# Patient Record
Sex: MALE | Employment: OTHER | URBAN - METROPOLITAN AREA
[De-identification: names, ages, dates, MRNs, and addresses within clinical notes are randomized per-mention and may not be internally consistent; named-entity substitution may affect disease eponyms.]

---

## 2022-10-29 ENCOUNTER — HOSPITAL ENCOUNTER (INPATIENT)
Facility: HOSPITAL | Age: 87
LOS: 3 days | Discharge: HOME | DRG: 378 | End: 2022-11-01
Attending: INTERNAL MEDICINE | Admitting: INTERNAL MEDICINE
Payer: MEDICARE

## 2022-10-29 DIAGNOSIS — K25.4 GASTROINTESTINAL HEMORRHAGE ASSOCIATED WITH GASTRIC ULCER: Primary | ICD-10-CM

## 2022-10-29 DIAGNOSIS — K92.2 GI BLEED: ICD-10-CM

## 2022-10-29 PROBLEM — I48.20 ATRIAL FIBRILLATION, CHRONIC: Status: ACTIVE | Noted: 2022-10-29

## 2022-10-29 PROBLEM — I10 PRIMARY HYPERTENSION: Status: ACTIVE | Noted: 2022-10-29

## 2022-10-29 PROBLEM — D62 ACUTE BLOOD LOSS ANEMIA: Status: ACTIVE | Noted: 2022-10-29

## 2022-10-29 LAB
ABO + RH BLD: NORMAL
ANION GAP SERPL CALC-SCNC: 9 MMOL/L (ref 8–16)
BASOPHILS # BLD AUTO: 0.06 K/UL (ref 0–0.2)
BASOPHILS NFR BLD: 0.5 % (ref 0–1.9)
BLD GP AB SCN CELLS X3 SERPL QL: NORMAL
BUN SERPL-MCNC: 23 MG/DL (ref 8–23)
CALCIUM SERPL-MCNC: 8 MG/DL (ref 8.7–10.5)
CHLORIDE SERPL-SCNC: 120 MMOL/L (ref 95–110)
CO2 SERPL-SCNC: 19 MMOL/L (ref 23–29)
CREAT SERPL-MCNC: 0.9 MG/DL (ref 0.5–1.4)
DIFFERENTIAL METHOD: ABNORMAL
EOSINOPHIL # BLD AUTO: 0.2 K/UL (ref 0–0.5)
EOSINOPHIL NFR BLD: 1.2 % (ref 0–8)
ERYTHROCYTE [DISTWIDTH] IN BLOOD BY AUTOMATED COUNT: 17.6 % (ref 11.5–14.5)
EST. GFR  (NO RACE VARIABLE): >60 ML/MIN/1.73 M^2
GLUCOSE SERPL-MCNC: 141 MG/DL (ref 70–110)
HCT VFR BLD AUTO: 27.6 % (ref 40–54)
HGB BLD-MCNC: 9.2 G/DL (ref 14–18)
IMM GRANULOCYTES # BLD AUTO: 0.12 K/UL (ref 0–0.04)
IMM GRANULOCYTES NFR BLD AUTO: 1 % (ref 0–0.5)
INR PPP: 1.1 (ref 0.8–1.2)
LYMPHOCYTES # BLD AUTO: 0.7 K/UL (ref 1–4.8)
LYMPHOCYTES NFR BLD: 5.3 % (ref 18–48)
MCH RBC QN AUTO: 30.9 PG (ref 27–31)
MCHC RBC AUTO-ENTMCNC: 33.3 G/DL (ref 32–36)
MCV RBC AUTO: 93 FL (ref 82–98)
MONOCYTES # BLD AUTO: 0.8 K/UL (ref 0.3–1)
MONOCYTES NFR BLD: 6.6 % (ref 4–15)
NEUTROPHILS # BLD AUTO: 10.5 K/UL (ref 1.8–7.7)
NEUTROPHILS NFR BLD: 85.4 % (ref 38–73)
NRBC BLD-RTO: 0 /100 WBC
PLATELET # BLD AUTO: 147 K/UL (ref 150–450)
PMV BLD AUTO: 9.4 FL (ref 9.2–12.9)
POTASSIUM SERPL-SCNC: 3.7 MMOL/L (ref 3.5–5.1)
PROTHROMBIN TIME: 11.5 SEC (ref 9–12.5)
RBC # BLD AUTO: 2.98 M/UL (ref 4.6–6.2)
SODIUM SERPL-SCNC: 148 MMOL/L (ref 136–145)
WBC # BLD AUTO: 12.34 K/UL (ref 3.9–12.7)

## 2022-10-29 PROCEDURE — 85025 COMPLETE CBC W/AUTO DIFF WBC: CPT | Performed by: HOSPITALIST

## 2022-10-29 PROCEDURE — C9113 INJ PANTOPRAZOLE SODIUM, VIA: HCPCS | Performed by: STUDENT IN AN ORGANIZED HEALTH CARE EDUCATION/TRAINING PROGRAM

## 2022-10-29 PROCEDURE — 36415 COLL VENOUS BLD VENIPUNCTURE: CPT | Performed by: HOSPITALIST

## 2022-10-29 PROCEDURE — 63600175 PHARM REV CODE 636 W HCPCS: Performed by: STUDENT IN AN ORGANIZED HEALTH CARE EDUCATION/TRAINING PROGRAM

## 2022-10-29 PROCEDURE — 25000003 PHARM REV CODE 250: Performed by: INTERNAL MEDICINE

## 2022-10-29 PROCEDURE — 80048 BASIC METABOLIC PNL TOTAL CA: CPT | Performed by: HOSPITALIST

## 2022-10-29 PROCEDURE — 11000001 HC ACUTE MED/SURG PRIVATE ROOM

## 2022-10-29 PROCEDURE — 85610 PROTHROMBIN TIME: CPT | Performed by: HOSPITALIST

## 2022-10-29 PROCEDURE — 86850 RBC ANTIBODY SCREEN: CPT | Performed by: HOSPITALIST

## 2022-10-29 RX ORDER — AMIODARONE HYDROCHLORIDE 200 MG/1
200 TABLET ORAL DAILY
COMMUNITY

## 2022-10-29 RX ORDER — ROSUVASTATIN CALCIUM 20 MG/1
20 TABLET, COATED ORAL DAILY
COMMUNITY

## 2022-10-29 RX ORDER — ATORVASTATIN CALCIUM 40 MG/1
40 TABLET, FILM COATED ORAL DAILY
Status: DISCONTINUED | OUTPATIENT
Start: 2022-10-29 | End: 2022-11-01 | Stop reason: HOSPADM

## 2022-10-29 RX ORDER — PANTOPRAZOLE SODIUM 40 MG/10ML
40 INJECTION, POWDER, LYOPHILIZED, FOR SOLUTION INTRAVENOUS 2 TIMES DAILY
Status: DISCONTINUED | OUTPATIENT
Start: 2022-10-29 | End: 2022-10-31

## 2022-10-29 RX ORDER — PANTOPRAZOLE SODIUM 40 MG/10ML
40 INJECTION, POWDER, LYOPHILIZED, FOR SOLUTION INTRAVENOUS 2 TIMES DAILY
Status: DISCONTINUED | OUTPATIENT
Start: 2022-10-29 | End: 2022-10-29

## 2022-10-29 RX ORDER — MUPIROCIN 20 MG/G
OINTMENT TOPICAL 2 TIMES DAILY
Status: DISCONTINUED | OUTPATIENT
Start: 2022-10-29 | End: 2022-11-01 | Stop reason: HOSPADM

## 2022-10-29 RX ORDER — AMIODARONE HYDROCHLORIDE 200 MG/1
200 TABLET ORAL DAILY
Status: DISCONTINUED | OUTPATIENT
Start: 2022-10-29 | End: 2022-11-01 | Stop reason: HOSPADM

## 2022-10-29 RX ADMIN — PANTOPRAZOLE SODIUM 40 MG: 40 INJECTION, POWDER, FOR SOLUTION INTRAVENOUS at 08:10

## 2022-10-29 RX ADMIN — MUPIROCIN: 20 OINTMENT TOPICAL at 09:10

## 2022-10-29 NOTE — PROVIDER TRANSFER
Outside Transfer Acceptance Note / [facility]    Referring facility:    Referring provider: DALILA POLK  Accepting facility: [facility]  Accepting provider: Kelly Delgado  Admitting provider: Edi Wong  Reason for transfer: Higher Level of Care   Transfer diagnosis: Upper GI Bleed  Transfer specialty requested: Gastroenterology  Transfer specialty notified: yes   Bed type requested: Standard  Isolation status: No active isolations   Admission class or status: in-patient    Narrative   86 yo on eliquis for atrial fibrillation presented to outside hospital with hematemeis. 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 Admitted with Hgb 11.7 and dropped as low as 6.9. He has received multiple transfusions, inculding two today after his EGD. As lesion difficult to ID and treat. There is no IR available or other specialist services at current facility that can assist with evaluation and treatment. Dr. Polk spoke to Dr. Rashawn Lundberg, who recommended he be initially evaluated by general GI services. No longer having active hematemesis but Hgb keeps dropping. Hgb WBC 9.9, Hgb 7.1, Plt 164, Na 147, K 3.8, Cl 120, CO2 22, BUN 39, SCr 0.9, Glc 122, Ca 7.5.. Vitals from this morning T 96.9 P101 resp 18 BP 89/52 100% on RA    Latest vitals 97.1  91  20  98/54  100%RA    Objective    Airway: room air  Allergies: Review of patient's allergies indicates:  No Known Allergies   NPO: yes  Anticoagulation:   None     Instructions      [facility]  Admit to Hospital Medicine  Upon patient arrival to floor, please contact Hospital Medicine on call.

## 2022-10-29 NOTE — ASSESSMENT & PLAN NOTE
S/O 2 EGD in last 3 days in [facility],showed,showed non-bleeding polypoid ,S/p 4 PRBC in out side facility,OAC on holds,on PPI.On my evaluations in [facility],family say they have been told ,patient gong to see  for advance endoscopy,spoke with transfer center,Connor from Transfer center say per record,no plan for advance endoscopy per ,,patient only need be seen by general GI,which I did and GI on call was informed.patient has no active bleeding at this time,byu had hematemesis and melena this  morning.

## 2022-10-29 NOTE — SUBJECTIVE & OBJECTIVE
Past Medical History:   Diagnosis Date    Anticoagulant long-term use     Arthritis     Coronary artery disease        Past Surgical History:   Procedure Laterality Date    ABDOMINAL SURGERY      APPENDECTOMY      BACK SURGERY  1974    CARDIAC SURGERY      Triple Bypass    CHOLECYSTECTOMY      EYE SURGERY      cataracts    VASCULAR SURGERY         Review of patient's allergies indicates:  No Known Allergies    No current facility-administered medications on file prior to encounter.     Current Outpatient Medications on File Prior to Encounter   Medication Sig    amiodarone (PACERONE) 200 MG Tab Take 200 mg by mouth once daily.    apixaban (ELIQUIS) 5 mg Tab Take 5 mg by mouth 2 (two) times daily.    ascorbic acid (VITAMIN C) 1000 MG tablet Take 1,000 mg by mouth once daily.    aspirin (ECOTRIN) 81 MG EC tablet Take 81 mg by mouth once daily.    dapsone 25 MG Tab Take 75 mg by mouth 2 (two) times daily.    isosorbide mononitrate (ISMO,MONOKET) 20 MG Tab Take 90 mg by mouth once daily.    lisinopril (PRINIVIL,ZESTRIL) 20 MG tablet Take 20 mg by mouth once daily.    magnesium oxide (MAG-OX) 400 mg tablet Take 400 mg by mouth once daily.    metoprolol succinate (TOPROL-XL) 50 MG 24 hr tablet Take 50 mg by mouth once daily.    multivitamin (THERAGRAN) per tablet Take 1 tablet by mouth once daily.    nitroGLYCERIN (NITROSTAT) 0.4 MG SL tablet Place 0.4 mg under the tongue every 5 (five) minutes as needed for Chest pain.    ranolazine (RANEXA) 500 MG Tb12 Take 1,000 mg by mouth 2 (two) times daily.    rosuvastatin (CRESTOR) 20 MG tablet Take 20 mg by mouth once daily.    simvastatin (ZOCOR) 40 MG tablet Take 40 mg by mouth every evening.    ticagrelor (BRILINTA) 90 mg tablet Take 90 mg by mouth 2 (two) times daily.    vitamin D 1000 units Tab Take 1,000 Units by mouth once daily.     Family History    None       Tobacco Use    Smoking status: Former    Smokeless tobacco: Former     Quit date: 5/25/1965   Substance and  Sexual Activity    Alcohol use: Yes     Comment: occasionally    Drug use: No    Sexual activity: Not on file     Review of Systems   Constitutional:  Positive for activity change and appetite change.   HENT:  Negative for congestion and dental problem.    Eyes:  Negative for discharge and itching.   Respiratory:  Negative for apnea and chest tightness.    Cardiovascular:  Negative for chest pain and leg swelling.   Gastrointestinal:  Negative for abdominal distention and abdominal pain.   Endocrine: Negative for cold intolerance and heat intolerance.   Genitourinary:  Negative for difficulty urinating and enuresis.   Musculoskeletal:  Negative for arthralgias and back pain.   Allergic/Immunologic: Negative for environmental allergies and food allergies.   Neurological:  Positive for weakness. Negative for dizziness and facial asymmetry.   Hematological:  Negative for adenopathy. Does not bruise/bleed easily.   Psychiatric/Behavioral:  Negative for agitation and behavioral problems.    Objective:     Vital Signs (Most Recent):  Temp: 98.1 °F (36.7 °C) (10/29/22 1324)  Pulse: 104 (10/29/22 1324)  Resp: 18 (10/29/22 1324)  BP: (!) 113/57 (10/29/22 1324)  SpO2: 99 % (10/29/22 1324)   Vital Signs (24h Range):  Temp:  [97.1 °F (36.2 °C)-98.1 °F (36.7 °C)] 98.1 °F (36.7 °C)  Pulse:  [] 104  Resp:  [18-20] 18  SpO2:  [99 %-100 %] 99 %  BP: ()/(54-57) 113/57     Weight: 83.5 kg (184 lb)  Body mass index is 25.66 kg/m².    Physical Exam  Constitutional:       Appearance: Normal appearance.   HENT:      Head: Normocephalic and atraumatic.      Nose: Nose normal.      Mouth/Throat:      Mouth: Mucous membranes are dry.   Eyes:      Extraocular Movements: Extraocular movements intact.      Pupils: Pupils are equal, round, and reactive to light.   Cardiovascular:      Rate and Rhythm: Normal rate and regular rhythm.      Heart sounds: No murmur heard.  Pulmonary:      Effort: No respiratory distress.   Abdominal:       General: There is no distension.      Tenderness: There is no abdominal tenderness.   Musculoskeletal:         General: No swelling or deformity.      Cervical back: Normal range of motion and neck supple.   Skin:     Coloration: Skin is not jaundiced.      Findings: No bruising.   Neurological:      General: No focal deficit present.      Mental Status: He is alert.      Cranial Nerves: No cranial nerve deficit.      Motor: No weakness.   Psychiatric:         Mood and Affect: Mood normal.         Behavior: Behavior normal.         CRANIAL NERVES     CN III, IV, VI   Pupils are equal, round, and reactive to light.     Significant Labs: All pertinent labs within the past 24 hours have been reviewed.  BMP: No results for input(s): GLU, NA, K, CL, CO2, BUN, CREATININE, CALCIUM, MG in the last 48 hours.  CBC: No results for input(s): WBC, HGB, HCT, PLT in the last 48 hours.  CMP: No results for input(s): NA, K, CL, CO2, GLU, BUN, CREATININE, CALCIUM, PROT, ALBUMIN, BILITOT, ALKPHOS, AST, ALT, ANIONGAP, EGFRNONAA in the last 48 hours.    Invalid input(s): ESTGFAFRICA    Significant Imaging: I have reviewed all pertinent imaging results/findings within the past 24 hours.

## 2022-10-29 NOTE — HPI
Per transfer  note,86 yo on eliquis for atrial fibrillation presented to outside hospital with hematemeis. 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 Admitted with Hgb 11.7 and dropped as low as 6.9. He has received multiple transfusions, inculding two today after his EGD. As lesion difficult to ID and treat. There is no IR available or other specialist services at current facility that can assist with evaluation and treatment. Dr. Polk spoke to Dr. Rashawn Lundberg, who recommended he be initially evaluated by general GI services. No longer having active hematemesis but Hgb keeps dropping. Hgb WBC 9.9, Hgb 7.1, Plt 164, Na 147, K 3.8, Cl 120, CO2 22, BUN 39, SCr 0.9, Glc 122, Ca 7.5.. Vitals from this morning T 96.9 P101 resp 18 BP 89/52 100% on RA,  On my evaluations in [facility],family say they have been told ,patient gong to see  for advance endoscopy,spoke with transfer center,Connor from Transfer center say per record,no plan for advance endoscopy per ,,patient only need be seen by general GI,which I did and GI on call was informed.patient has no active bleeding at this time,byu had hematemesis and melena this  morning.

## 2022-10-29 NOTE — H&P
[facility] Medicine  History & Physical    Patient Name: Paul Adame  MRN: [id number]  Patient Class: IP- Inpatient  Admission Date: 10/29/2022  Attending Physician: Timothy Prieto    Primary Care Provider: Fredo Lemus MD         Patient information was obtained from patient, past medical records and transfer center .     Subjective:     Principal Problem:Acute GI bleeding    Chief Complaint: No chief complaint on file.       HPI: Per transfer  note,86 yo on eliquis for atrial fibrillation presented to outside hospital with hematemeis. 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 Admitted with Hgb 11.7 and dropped as low as 6.9. He has received multiple transfusions, inculding two today after his EGD. As lesion difficult to ID and treat. There is no IR available or other specialist services at current facility that can assist with evaluation and treatment. Dr. Polk spoke to Dr. Rashawn Lundberg, who recommended he be initially evaluated by general GI services. No longer having active hematemesis but Hgb keeps dropping. Hgb WBC 9.9, Hgb 7.1, Plt 164, Na 147, K 3.8, Cl 120, CO2 22, BUN 39, SCr 0.9, Glc 122, Ca 7.5.. Vitals from this morning T 96.9 P101 resp 18 BP 89/52 100% on RA,  On my evaluations in [facility],family say they have been told ,patient gong to see  for advance endoscopy,spoke with transfer center,Connor from Transfer center say per record,no plan for advance endoscopy per ,,patient only need be seen by general GI,which I did and GI on call was informed.patient has no active bleeding at this time,byu had hematemesis and melena this  morning.      Past Medical History:   Diagnosis Date    Anticoagulant long-term use     Arthritis     Coronary artery disease        Past Surgical History:   Procedure Laterality Date    ABDOMINAL SURGERY      APPENDECTOMY       BACK SURGERY  1974    CARDIAC SURGERY      Triple Bypass    CHOLECYSTECTOMY      EYE SURGERY      cataracts    VASCULAR SURGERY         Review of patient's allergies indicates:  No Known Allergies    No current facility-administered medications on file prior to encounter.     Current Outpatient Medications on File Prior to Encounter   Medication Sig    amiodarone (PACERONE) 200 MG Tab Take 200 mg by mouth once daily.    apixaban (ELIQUIS) 5 mg Tab Take 5 mg by mouth 2 (two) times daily.    ascorbic acid (VITAMIN C) 1000 MG tablet Take 1,000 mg by mouth once daily.    aspirin (ECOTRIN) 81 MG EC tablet Take 81 mg by mouth once daily.    dapsone 25 MG Tab Take 75 mg by mouth 2 (two) times daily.    isosorbide mononitrate (ISMO,MONOKET) 20 MG Tab Take 90 mg by mouth once daily.    lisinopril (PRINIVIL,ZESTRIL) 20 MG tablet Take 20 mg by mouth once daily.    magnesium oxide (MAG-OX) 400 mg tablet Take 400 mg by mouth once daily.    metoprolol succinate (TOPROL-XL) 50 MG 24 hr tablet Take 50 mg by mouth once daily.    multivitamin (THERAGRAN) per tablet Take 1 tablet by mouth once daily.    nitroGLYCERIN (NITROSTAT) 0.4 MG SL tablet Place 0.4 mg under the tongue every 5 (five) minutes as needed for Chest pain.    ranolazine (RANEXA) 500 MG Tb12 Take 1,000 mg by mouth 2 (two) times daily.    rosuvastatin (CRESTOR) 20 MG tablet Take 20 mg by mouth once daily.    simvastatin (ZOCOR) 40 MG tablet Take 40 mg by mouth every evening.    ticagrelor (BRILINTA) 90 mg tablet Take 90 mg by mouth 2 (two) times daily.    vitamin D 1000 units Tab Take 1,000 Units by mouth once daily.     Family History    None       Tobacco Use    Smoking status: Former    Smokeless tobacco: Former     Quit date: 5/25/1965   Substance and Sexual Activity    Alcohol use: Yes     Comment: occasionally    Drug use: No    Sexual activity: Not on file     Review of Systems   Constitutional:  Positive for activity change and  appetite change.   HENT:  Negative for congestion and dental problem.    Eyes:  Negative for discharge and itching.   Respiratory:  Negative for apnea and chest tightness.    Cardiovascular:  Negative for chest pain and leg swelling.   Gastrointestinal:  Negative for abdominal distention and abdominal pain.   Endocrine: Negative for cold intolerance and heat intolerance.   Genitourinary:  Negative for difficulty urinating and enuresis.   Musculoskeletal:  Negative for arthralgias and back pain.   Allergic/Immunologic: Negative for environmental allergies and food allergies.   Neurological:  Positive for weakness. Negative for dizziness and facial asymmetry.   Hematological:  Negative for adenopathy. Does not bruise/bleed easily.   Psychiatric/Behavioral:  Negative for agitation and behavioral problems.    Objective:     Vital Signs (Most Recent):  Temp: 98.1 °F (36.7 °C) (10/29/22 1324)  Pulse: 104 (10/29/22 1324)  Resp: 18 (10/29/22 1324)  BP: (!) 113/57 (10/29/22 1324)  SpO2: 99 % (10/29/22 1324)   Vital Signs (24h Range):  Temp:  [97.1 °F (36.2 °C)-98.1 °F (36.7 °C)] 98.1 °F (36.7 °C)  Pulse:  [] 104  Resp:  [18-20] 18  SpO2:  [99 %-100 %] 99 %  BP: ()/(54-57) 113/57     Weight: 83.5 kg (184 lb)  Body mass index is 25.66 kg/m².    Physical Exam  Constitutional:       Appearance: Normal appearance.   HENT:      Head: Normocephalic and atraumatic.      Nose: Nose normal.      Mouth/Throat:      Mouth: Mucous membranes are dry.   Eyes:      Extraocular Movements: Extraocular movements intact.      Pupils: Pupils are equal, round, and reactive to light.   Cardiovascular:      Rate and Rhythm: Normal rate and regular rhythm.      Heart sounds: No murmur heard.  Pulmonary:      Effort: No respiratory distress.   Abdominal:      General: There is no distension.      Tenderness: There is no abdominal tenderness.   Musculoskeletal:         General: No swelling or deformity.      Cervical back: Normal range of  motion and neck supple.   Skin:     Coloration: Skin is not jaundiced.      Findings: No bruising.   Neurological:      General: No focal deficit present.      Mental Status: He is alert.      Cranial Nerves: No cranial nerve deficit.      Motor: No weakness.   Psychiatric:         Mood and Affect: Mood normal.         Behavior: Behavior normal.         CRANIAL NERVES     CN III, IV, VI   Pupils are equal, round, and reactive to light.     Significant Labs: All pertinent labs within the past 24 hours have been reviewed.  BMP: No results for input(s): GLU, NA, K, CL, CO2, BUN, CREATININE, CALCIUM, MG in the last 48 hours.  CBC: No results for input(s): WBC, HGB, HCT, PLT in the last 48 hours.  CMP: No results for input(s): NA, K, CL, CO2, GLU, BUN, CREATININE, CALCIUM, PROT, ALBUMIN, BILITOT, ALKPHOS, AST, ALT, ANIONGAP, EGFRNONAA in the last 48 hours.    Invalid input(s): ESTGFAFRICA    Significant Imaging: I have reviewed all pertinent imaging results/findings within the past 24 hours.    Assessment/Plan:     * Acute GI bleeding  S/O 2 EGD in last 3 days in [facility],showed,showed non-bleeding polypoid ,S/p 4 PRBC in out side facility,OAC on holds,on PPI.On my evaluations in [facility],family say they have been told ,patient gong to see  for advance endoscopy,spoke with transfer center,Connor from Transfer center say per record,no plan for advance endoscopy per ,,patient only need be seen by general GI,which I did and GI on call was informed.patient has no active bleeding at this time,byu had hematemesis and melena this  morning.      Primary hypertension  Hold home BOP med's at this time.      Atrial fibrillation, chronic  hold OAC at this time,will monitor.        Acute blood loss anemia  As above.      Coronary artery disease involving native coronary artery without angina pectoris  Will monitor,ASA and brillianta on hold duo to GI bleeding.        VTE Risk Mitigation (From  admission, onward)    None             Timothy Prieto MD  Department of Hospital Medicine   [facility]

## 2022-10-29 NOTE — NURSING
10/29/22 1310 Patient arrives to the floor at this time. Patient is alert and oriented x 4. Patient on room air. Patient denies pain. Patient placed on telemetry box [id number].       10/29/22 1325 Patient noted to have blanchable redness to coccyx. Nurse applied mepilex at this time. Patient noted to also have flaking to LLE, right eye drainage, and x 2 scabs to superior head.

## 2022-10-30 PROBLEM — R53.81 DEBILITY: Status: ACTIVE | Noted: 2022-10-30

## 2022-10-30 LAB
BASOPHILS # BLD AUTO: 0.07 K/UL (ref 0–0.2)
BASOPHILS # BLD AUTO: 0.08 K/UL (ref 0–0.2)
BASOPHILS # BLD AUTO: 0.08 K/UL (ref 0–0.2)
BASOPHILS NFR BLD: 0.8 % (ref 0–1.9)
DIFFERENTIAL METHOD: ABNORMAL
EOSINOPHIL # BLD AUTO: 0.2 K/UL (ref 0–0.5)
EOSINOPHIL # BLD AUTO: 0.2 K/UL (ref 0–0.5)
EOSINOPHIL # BLD AUTO: 0.3 K/UL (ref 0–0.5)
EOSINOPHIL NFR BLD: 2.3 % (ref 0–8)
EOSINOPHIL NFR BLD: 2.3 % (ref 0–8)
EOSINOPHIL NFR BLD: 2.6 % (ref 0–8)
ERYTHROCYTE [DISTWIDTH] IN BLOOD BY AUTOMATED COUNT: 17.7 % (ref 11.5–14.5)
ERYTHROCYTE [DISTWIDTH] IN BLOOD BY AUTOMATED COUNT: 18.1 % (ref 11.5–14.5)
ERYTHROCYTE [DISTWIDTH] IN BLOOD BY AUTOMATED COUNT: 18.1 % (ref 11.5–14.5)
HCT VFR BLD AUTO: 27.4 % (ref 40–54)
HCT VFR BLD AUTO: 29 % (ref 40–54)
HCT VFR BLD AUTO: 29.5 % (ref 40–54)
HGB BLD-MCNC: 8.8 G/DL (ref 14–18)
HGB BLD-MCNC: 9.2 G/DL (ref 14–18)
HGB BLD-MCNC: 9.5 G/DL (ref 14–18)
IMM GRANULOCYTES # BLD AUTO: 0.09 K/UL (ref 0–0.04)
IMM GRANULOCYTES # BLD AUTO: 0.1 K/UL (ref 0–0.04)
IMM GRANULOCYTES # BLD AUTO: 0.11 K/UL (ref 0–0.04)
IMM GRANULOCYTES NFR BLD AUTO: 0.9 % (ref 0–0.5)
IMM GRANULOCYTES NFR BLD AUTO: 1.1 % (ref 0–0.5)
IMM GRANULOCYTES NFR BLD AUTO: 1.1 % (ref 0–0.5)
LYMPHOCYTES # BLD AUTO: 0.9 K/UL (ref 1–4.8)
LYMPHOCYTES # BLD AUTO: 1.1 K/UL (ref 1–4.8)
LYMPHOCYTES # BLD AUTO: 1.1 K/UL (ref 1–4.8)
LYMPHOCYTES NFR BLD: 10.6 % (ref 18–48)
LYMPHOCYTES NFR BLD: 10.7 % (ref 18–48)
LYMPHOCYTES NFR BLD: 9.6 % (ref 18–48)
MCH RBC QN AUTO: 29.8 PG (ref 27–31)
MCH RBC QN AUTO: 30.4 PG (ref 27–31)
MCH RBC QN AUTO: 30.7 PG (ref 27–31)
MCHC RBC AUTO-ENTMCNC: 31.7 G/DL (ref 32–36)
MCHC RBC AUTO-ENTMCNC: 32.1 G/DL (ref 32–36)
MCHC RBC AUTO-ENTMCNC: 32.2 G/DL (ref 32–36)
MCV RBC AUTO: 94 FL (ref 82–98)
MCV RBC AUTO: 95 FL (ref 82–98)
MCV RBC AUTO: 96 FL (ref 82–98)
MONOCYTES # BLD AUTO: 0.7 K/UL (ref 0.3–1)
MONOCYTES # BLD AUTO: 0.8 K/UL (ref 0.3–1)
MONOCYTES # BLD AUTO: 0.8 K/UL (ref 0.3–1)
MONOCYTES NFR BLD: 7.9 % (ref 4–15)
MONOCYTES NFR BLD: 8.1 % (ref 4–15)
MONOCYTES NFR BLD: 8.1 % (ref 4–15)
NEUTROPHILS # BLD AUTO: 7.1 K/UL (ref 1.8–7.7)
NEUTROPHILS # BLD AUTO: 8 K/UL (ref 1.8–7.7)
NEUTROPHILS # BLD AUTO: 8.1 K/UL (ref 1.8–7.7)
NEUTROPHILS NFR BLD: 77 % (ref 38–73)
NEUTROPHILS NFR BLD: 77.2 % (ref 38–73)
NEUTROPHILS NFR BLD: 78.1 % (ref 38–73)
NRBC BLD-RTO: 0 /100 WBC
PLATELET # BLD AUTO: 148 K/UL (ref 150–450)
PLATELET # BLD AUTO: 173 K/UL (ref 150–450)
PLATELET # BLD AUTO: 178 K/UL (ref 150–450)
PMV BLD AUTO: 9.5 FL (ref 9.2–12.9)
PMV BLD AUTO: 9.5 FL (ref 9.2–12.9)
PMV BLD AUTO: 9.7 FL (ref 9.2–12.9)
RBC # BLD AUTO: 2.87 M/UL (ref 4.6–6.2)
RBC # BLD AUTO: 3.09 M/UL (ref 4.6–6.2)
RBC # BLD AUTO: 3.12 M/UL (ref 4.6–6.2)
WBC # BLD AUTO: 10.34 K/UL (ref 3.9–12.7)
WBC # BLD AUTO: 10.42 K/UL (ref 3.9–12.7)
WBC # BLD AUTO: 9.03 K/UL (ref 3.9–12.7)

## 2022-10-30 PROCEDURE — 99223 1ST HOSP IP/OBS HIGH 75: CPT | Mod: ,,, | Performed by: STUDENT IN AN ORGANIZED HEALTH CARE EDUCATION/TRAINING PROGRAM

## 2022-10-30 PROCEDURE — 36415 COLL VENOUS BLD VENIPUNCTURE: CPT | Performed by: HOSPITALIST

## 2022-10-30 PROCEDURE — 99223 PR INITIAL HOSPITAL CARE,LEVL III: ICD-10-PCS | Mod: ,,, | Performed by: STUDENT IN AN ORGANIZED HEALTH CARE EDUCATION/TRAINING PROGRAM

## 2022-10-30 PROCEDURE — 11000001 HC ACUTE MED/SURG PRIVATE ROOM

## 2022-10-30 PROCEDURE — 85025 COMPLETE CBC W/AUTO DIFF WBC: CPT | Mod: 91 | Performed by: HOSPITALIST

## 2022-10-30 PROCEDURE — 25000003 PHARM REV CODE 250: Performed by: HOSPITALIST

## 2022-10-30 PROCEDURE — 63600175 PHARM REV CODE 636 W HCPCS: Performed by: STUDENT IN AN ORGANIZED HEALTH CARE EDUCATION/TRAINING PROGRAM

## 2022-10-30 PROCEDURE — 25000003 PHARM REV CODE 250: Performed by: INTERNAL MEDICINE

## 2022-10-30 PROCEDURE — C9113 INJ PANTOPRAZOLE SODIUM, VIA: HCPCS | Performed by: STUDENT IN AN ORGANIZED HEALTH CARE EDUCATION/TRAINING PROGRAM

## 2022-10-30 RX ORDER — SODIUM CHLORIDE 9 MG/ML
INJECTION, SOLUTION INTRAVENOUS CONTINUOUS
Status: DISCONTINUED | OUTPATIENT
Start: 2022-10-30 | End: 2022-10-31

## 2022-10-30 RX ADMIN — SODIUM CHLORIDE: 0.9 INJECTION, SOLUTION INTRAVENOUS at 11:10

## 2022-10-30 RX ADMIN — MUPIROCIN: 20 OINTMENT TOPICAL at 08:10

## 2022-10-30 RX ADMIN — PANTOPRAZOLE SODIUM 40 MG: 40 INJECTION, POWDER, FOR SOLUTION INTRAVENOUS at 09:10

## 2022-10-30 RX ADMIN — PANTOPRAZOLE SODIUM 40 MG: 40 INJECTION, POWDER, FOR SOLUTION INTRAVENOUS at 08:10

## 2022-10-30 RX ADMIN — AMIODARONE HYDROCHLORIDE 200 MG: 200 TABLET ORAL at 08:10

## 2022-10-30 RX ADMIN — MUPIROCIN: 20 OINTMENT TOPICAL at 09:10

## 2022-10-30 RX ADMIN — ATORVASTATIN CALCIUM 40 MG: 40 TABLET, FILM COATED ORAL at 08:10

## 2022-10-30 RX ADMIN — SODIUM CHLORIDE: 0.9 INJECTION, SOLUTION INTRAVENOUS at 05:10

## 2022-10-30 NOTE — ASSESSMENT & PLAN NOTE
S/O 2 EGD in last 3 days in [facility],showed,showed non-bleeding polypoid ,S/p 4 PRBC in out side facility,OAC on holds,on PPI.On my evaluations in [facility],family say they have been told ,patient gong to see  for advance endoscopy,spoke with transfer center,Connor from Transfer center say per record,no plan for advance endoscopy per ,,patient only need be seen by general GI,which I did and GI on call was informed.patient has no active bleeding at this time,byu had hematemesis and melena this  Morning.    Plan for EGD on 10/31. H/H stable. No further bleeding

## 2022-10-30 NOTE — CONSULTS
[facility]  Gastroenterology  Consult Note    Patient Name: Paul Adame  MRN: [id number]  Admission Date: 10/29/2022  Hospital Length of Stay: 1 days  Code Status: Full Code   Attending Provider: Ameya Posadas MD   Consulting Provider: Loretta Lentz MD  Primary Care Physician: Fredo Lemus MD  Principal Problem:Acute GI bleeding    Inpatient consult to Gastroenterology  Consult performed by: Loretta Lentz MD  Consult ordered by: Timothy Prieto MD      Subjective:     HPI: Paul Adame is a 87 y.o. male with history of Afib on Eliquis, CAD, HTN who is admitted as a transfer to OSH for upper GI bleeding. He presented to [place] with acute onset of hematemesis. He had an EGD by Dr Polk which showed a possible submucosal mass vs ulcer in the stomach which had an adherent clot. He did not intervene on this as he does not have IR or surgery back up in the case of bleeding. Case was discussed with Dr Polk and Dr Lundberg about the possible need for EUS. It was felt that patient should first have EGD with possible endoscopic intervention for any bleeding and a second look at this area prior to EUS. He has been hemodynamically stable without continued overt GI bleeding since arriving. He has no abdominal pain. Daughter at bedside.       Past Medical History:   Diagnosis Date    Anticoagulant long-term use     Arthritis     Coronary artery disease     Primary hypertension 10/29/2022       Past Surgical History:   Procedure Laterality Date    ABDOMINAL SURGERY      APPENDECTOMY      BACK SURGERY  1974    CARDIAC SURGERY      Triple Bypass    CHOLECYSTECTOMY      EYE SURGERY      cataracts    VASCULAR SURGERY         No family history on file.    Social History     Socioeconomic History    Marital status:    Tobacco Use    Smoking status: Former    Smokeless tobacco: Former     Quit date: 5/25/1965   Substance and Sexual Activity    Alcohol use: Yes     Comment: occasionally     Drug use: No       No current facility-administered medications on file prior to encounter.     Current Outpatient Medications on File Prior to Encounter   Medication Sig Dispense Refill    amiodarone (PACERONE) 200 MG Tab Take 200 mg by mouth once daily.      apixaban (ELIQUIS) 5 mg Tab Take 5 mg by mouth 2 (two) times daily.      ascorbic acid (VITAMIN C) 1000 MG tablet Take 1,000 mg by mouth once daily.      aspirin (ECOTRIN) 81 MG EC tablet Take 81 mg by mouth once daily.      dapsone 25 MG Tab Take 75 mg by mouth 2 (two) times daily.      isosorbide mononitrate (ISMO,MONOKET) 20 MG Tab Take 90 mg by mouth once daily.      lisinopril (PRINIVIL,ZESTRIL) 20 MG tablet Take 20 mg by mouth once daily.      magnesium oxide (MAG-OX) 400 mg tablet Take 400 mg by mouth once daily.      metoprolol succinate (TOPROL-XL) 50 MG 24 hr tablet Take 50 mg by mouth once daily.      multivitamin (THERAGRAN) per tablet Take 1 tablet by mouth once daily.      nitroGLYCERIN (NITROSTAT) 0.4 MG SL tablet Place 0.4 mg under the tongue every 5 (five) minutes as needed for Chest pain.      ranolazine (RANEXA) 500 MG Tb12 Take 1,000 mg by mouth 2 (two) times daily.      rosuvastatin (CRESTOR) 20 MG tablet Take 20 mg by mouth once daily.      simvastatin (ZOCOR) 40 MG tablet Take 40 mg by mouth every evening.      ticagrelor (BRILINTA) 90 mg tablet Take 90 mg by mouth 2 (two) times daily.      vitamin D 1000 units Tab Take 1,000 Units by mouth once daily.         Review of patient's allergies indicates:  No Known Allergies    ROS     Objective:     Vitals:    10/30/22 0722   BP: (!) 99/55   Pulse: 89   Resp: 18   Temp: 98.1 °F (36.7 °C)         Constitutional:  not in acute distress and well developed  HENT: Head: Normal, normocephalic, atraumatic.  Eyes: conjunctiva clear and sclera nonicteric  Cardiovascular: regular rate and rhythm and no murmur  Respiratory: normal chest expansion & respiratory effort   and no accessory muscle use  GI:  soft, non-tender, without masses or organomegaly  Musculoskeletal: no muscular tenderness noted  Skin: normal color  Neurological: alert, oriented x3  Psychiatric: mood and affect are within normal limits     Significant Labs:  Recent Labs   Lab 10/29/22  1550 10/30/22  0425 10/30/22  0751   HGB 9.2* 9.2*  9.5* 8.8*       Lab Results   Component Value Date    WBC 9.03 10/30/2022    HGB 8.8 (L) 10/30/2022    HCT 27.4 (L) 10/30/2022    MCV 96 10/30/2022     (L) 10/30/2022       Lab Results   Component Value Date     (H) 10/29/2022    K 3.7 10/29/2022     (H) 10/29/2022    CO2 19 (L) 10/29/2022    BUN 23 10/29/2022    CREATININE 0.9 10/29/2022    CALCIUM 8.0 (L) 10/29/2022    ANIONGAP 9 10/29/2022    ESTGFRAFRICA >60.0 05/26/2016    EGFRNONAA >60.0 05/26/2016       No results found for: ALT, AST, GGT, ALKPHOS, BILITOT    Lab Results   Component Value Date    INR 1.1 10/29/2022    INR 1.0 05/25/2016       Significant Imaging:  Reviewed pertinent radiology findings.       Assessment/Plan:     Paul Adame is a 87 y.o. male with history of Afib on Eliquis who was admitted to OSH with hematemesis. OSH EGD showed possible submucosal ulcerated mass with adherent clot which was not treated endoscopically due to concern for worsening bleeding and lack of IR or surgery back up. I discussed the case with Dr Polk (GI at [facility]) and he agrees that a re-look at this area is the right next step as he felt visualization was not adequate during his exam due to bleeding and adherent clot. We will plan for EGD tomorrow. If needed can set up EUS pending EGD findings      Problem List:  Hematemesis   Gastric mass vs ulcer   Afib on Eliquis   Acute blood loss anemia     Recommendations:    - Plan for EGD tomorrow   - CLD today, NPO at midnight   - Trend H/H   - Two large bore PIV   - Hold eliquis     Thank you for involving us in the care of Paul Adame. Please call with any additional questions, concerns or changes in  the patient's clinical status. We will continue to follow.     Loretta Lentz   Gastroenterology     [facility]

## 2022-10-30 NOTE — SUBJECTIVE & OBJECTIVE
Interval History: No new issues. No bleeding       Review of Systems   Constitutional:  Negative for activity change, appetite change and chills.   HENT:  Negative for congestion and dental problem.    Eyes:  Negative for discharge and itching.   Respiratory:  Negative for apnea and chest tightness.    Cardiovascular:  Negative for chest pain.   Gastrointestinal:  Negative for abdominal distention and abdominal pain.   Endocrine: Negative for cold intolerance and heat intolerance.   Genitourinary:  Negative for difficulty urinating and dysuria.   Musculoskeletal:  Negative for arthralgias.   Neurological:  Negative for dizziness and facial asymmetry.   Psychiatric/Behavioral:  Negative for agitation and behavioral problems.    Objective:     Vital Signs (Most Recent):  Temp: 98.1 °F (36.7 °C) (10/30/22 0722)  Pulse: 89 (10/30/22 0722)  Resp: 18 (10/30/22 0722)  BP: (!) 99/55 (10/30/22 0722)  SpO2: 95 % (10/30/22 0722)   Vital Signs (24h Range):  Temp:  [97.9 °F (36.6 °C)-98.2 °F (36.8 °C)] 98.1 °F (36.7 °C)  Pulse:  [] 89  Resp:  [17-18] 18  SpO2:  [95 %-99 %] 95 %  BP: ()/(51-59) 99/55     Weight: 83.5 kg (184 lb)  Body mass index is 25.66 kg/m².    Intake/Output Summary (Last 24 hours) at 10/30/2022 1020  Last data filed at 10/30/2022 0505  Gross per 24 hour   Intake 120 ml   Output 450 ml   Net -330 ml      Physical Exam  Vitals and nursing note reviewed.   Constitutional:       General: He is not in acute distress.     Appearance: Normal appearance. He is not toxic-appearing.   HENT:      Head: Normocephalic.      Nose: Nose normal.   Eyes:      Conjunctiva/sclera: Conjunctivae normal.   Cardiovascular:      Rate and Rhythm: Normal rate and regular rhythm.   Pulmonary:      Effort: Pulmonary effort is normal. No respiratory distress.   Skin:     General: Skin is warm and dry.   Neurological:      Mental Status: He is alert and oriented to person, place, and time.   Psychiatric:         Behavior:  Behavior normal.       Significant Labs: All pertinent labs within the past 24 hours have been reviewed.  BMP:   Recent Labs   Lab 10/29/22  1550   *   *   K 3.7   *   CO2 19*   BUN 23   CREATININE 0.9   CALCIUM 8.0*     CBC:   Recent Labs   Lab 10/29/22  1550 10/30/22  0425 10/30/22  0751   WBC 12.34 10.42  10.34 9.03   HGB 9.2* 9.2*  9.5* 8.8*   HCT 27.6* 29.0*  29.5* 27.4*   * 173  178 148*       Significant Imaging:

## 2022-10-30 NOTE — PROGRESS NOTES
[facility] Medicine  Progress Note    Patient Name: Paul Adame  MRN: [id number]  Patient Class: IP- Inpatient   Admission Date: 10/29/2022  Length of Stay: 1 days  Attending Physician: Ameya Posadas MD  Primary Care Provider: Fredo Lemsu MD        Subjective:     Principal Problem:Acute GI bleeding        HPI:  Per transfer  note,88 yo on eliquis for atrial fibrillation presented to outside hospital with hematemeis. 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 Admitted with Hgb 11.7 and dropped as low as 6.9. He has received multiple transfusions, inculding two today after his EGD. As lesion difficult to ID and treat. There is no IR available or other specialist services at current facility that can assist with evaluation and treatment. Dr. Polk spoke to Dr. Rashawn Lundberg, who recommended he be initially evaluated by general GI services. No longer having active hematemesis but Hgb keeps dropping. Hgb WBC 9.9, Hgb 7.1, Plt 164, Na 147, K 3.8, Cl 120, CO2 22, BUN 39, SCr 0.9, Glc 122, Ca 7.5.. Vitals from this morning T 96.9 P101 resp 18 BP 89/52 100% on RA,  On my evaluations in [facility],family say they have been told ,patient gong to see  for advance endoscopy,spoke with transfer center,Connor from Transfer center say per record,no plan for advance endoscopy per ,,patient only need be seen by general GI,which I did and GI on call was informed.patient has no active bleeding at this time,byu had hematemesis and melena this  morning.      Overview/Hospital Course:  Patient admitted to the hospital for GI bleed.  Patient was outside transfer for possible EUS.  Patient at other hospital required multiple units of blood. Possible mucosal lesion vs. Ulcer.  EGD outside was not best view due to blood.  GI consulted here and will start with an EGD on 10/31/22 and decide if  any further advanced endoscopy is indicated.  No further bleeding.  H/H. Stable.       Interval History: No new issues. No bleeding       Review of Systems   Constitutional:  Negative for activity change, appetite change and chills.   HENT:  Negative for congestion and dental problem.    Eyes:  Negative for discharge and itching.   Respiratory:  Negative for apnea and chest tightness.    Cardiovascular:  Negative for chest pain.   Gastrointestinal:  Negative for abdominal distention and abdominal pain.   Endocrine: Negative for cold intolerance and heat intolerance.   Genitourinary:  Negative for difficulty urinating and dysuria.   Musculoskeletal:  Negative for arthralgias.   Neurological:  Negative for dizziness and facial asymmetry.   Psychiatric/Behavioral:  Negative for agitation and behavioral problems.    Objective:     Vital Signs (Most Recent):  Temp: 98.1 °F (36.7 °C) (10/30/22 0722)  Pulse: 89 (10/30/22 0722)  Resp: 18 (10/30/22 0722)  BP: (!) 99/55 (10/30/22 0722)  SpO2: 95 % (10/30/22 0722)   Vital Signs (24h Range):  Temp:  [97.9 °F (36.6 °C)-98.2 °F (36.8 °C)] 98.1 °F (36.7 °C)  Pulse:  [] 89  Resp:  [17-18] 18  SpO2:  [95 %-99 %] 95 %  BP: ()/(51-59) 99/55     Weight: 83.5 kg (184 lb)  Body mass index is 25.66 kg/m².    Intake/Output Summary (Last 24 hours) at 10/30/2022 1020  Last data filed at 10/30/2022 0505  Gross per 24 hour   Intake 120 ml   Output 450 ml   Net -330 ml      Physical Exam  Vitals and nursing note reviewed.   Constitutional:       General: He is not in acute distress.     Appearance: Normal appearance. He is not toxic-appearing.   HENT:      Head: Normocephalic.      Nose: Nose normal.   Eyes:      Conjunctiva/sclera: Conjunctivae normal.   Cardiovascular:      Rate and Rhythm: Normal rate and regular rhythm.   Pulmonary:      Effort: Pulmonary effort is normal. No respiratory distress.   Skin:     General: Skin is warm and dry.   Neurological:      Mental Status: He  is alert and oriented to person, place, and time.   Psychiatric:         Behavior: Behavior normal.       Significant Labs: All pertinent labs within the past 24 hours have been reviewed.  BMP:   Recent Labs   Lab 10/29/22  1550   *   *   K 3.7   *   CO2 19*   BUN 23   CREATININE 0.9   CALCIUM 8.0*     CBC:   Recent Labs   Lab 10/29/22  1550 10/30/22  0425 10/30/22  0751   WBC 12.34 10.42  10.34 9.03   HGB 9.2* 9.2*  9.5* 8.8*   HCT 27.6* 29.0*  29.5* 27.4*   * 173  178 148*       Significant Imaging:       Assessment/Plan:      * Acute GI bleeding  S/O 2 EGD in last 3 days in [facility] general,showed,showed non-bleeding polypoid ,S/p 4 PRBC in out side facility,OAC on holds,on PPI.On my evaluations in [facility],family say they have been told ,patient gong to see  for advance endoscopy,spoke with transfer center,Connor from Transfer center say per record,no plan for advance endoscopy per ,,patient only need be seen by general GI,which I did and GI on call was informed.patient has no active bleeding at this time,byu had hematemesis and melena this  Morning.    Plan for EGD on 10/31. H/H stable. No further bleeding      Debility  Will consult PT/OT today      Primary hypertension  Hold home BOP med's at this time.      Atrial fibrillation, chronic  hold OAC at this time,will monitor.        Acute blood loss anemia  As above.      Coronary artery disease involving native coronary artery without angina pectoris  Will monitor,ASA and brillianta on hold duo to GI bleeding.        VTE Risk Mitigation (From admission, onward)         Ordered     Place sequential compression device  Until discontinued         10/30/22 0943                Discharge Planning   HUNTER:      Code Status: Full Code   Is the patient medically ready for discharge?:     Reason for patient still in hospital (select all that apply): Patient unstable                     Ameya Dixon  MD  Department of Hospital Medicine   [facility] - Med Surg

## 2022-10-30 NOTE — HOSPITAL COURSE
Mr Paul Adame was admitted for upper GI bleeding. Patient was outside transfer for possible EUS.  Patient at other hospital required multiple units of blood. EGD poor views due to blood. GI consulted here. Repeat EGD 10/31 showed non bleeding gastric ulcer. Hgb remains stable. Resumed diet. Resumed eliquis. No further signs of bleeding. Stable for discharge to home with pantoprazole 40mg PO BID x2 months. Repeat EGD in 2 months. Follow up with Cardiology to discuss if asa is necessary. Follow up with PCP for repeat CBC. He is also s/p R eye surgery after prior shingles and still has stitches in place- follow up with Ophtho already scheduled for tomorrow. Plan discussed with patient and daughter.

## 2022-10-31 ENCOUNTER — ANESTHESIA (OUTPATIENT)
Dept: ENDOSCOPY | Facility: HOSPITAL | Age: 87
DRG: 378 | End: 2022-10-31
Payer: MEDICARE

## 2022-10-31 ENCOUNTER — ANESTHESIA EVENT (OUTPATIENT)
Dept: ENDOSCOPY | Facility: HOSPITAL | Age: 87
DRG: 378 | End: 2022-10-31
Payer: MEDICARE

## 2022-10-31 PROBLEM — K25.4 GASTROINTESTINAL HEMORRHAGE ASSOCIATED WITH GASTRIC ULCER: Status: ACTIVE | Noted: 2022-10-29

## 2022-10-31 LAB
BASOPHILS # BLD AUTO: 0.07 K/UL (ref 0–0.2)
BASOPHILS NFR BLD: 0.9 % (ref 0–1.9)
DIFFERENTIAL METHOD: ABNORMAL
EOSINOPHIL # BLD AUTO: 0.2 K/UL (ref 0–0.5)
EOSINOPHIL NFR BLD: 2.9 % (ref 0–8)
ERYTHROCYTE [DISTWIDTH] IN BLOOD BY AUTOMATED COUNT: 19 % (ref 11.5–14.5)
HCT VFR BLD AUTO: 27.5 % (ref 40–54)
HGB BLD-MCNC: 8.5 G/DL (ref 14–18)
IMM GRANULOCYTES # BLD AUTO: 0.08 K/UL (ref 0–0.04)
IMM GRANULOCYTES NFR BLD AUTO: 1.1 % (ref 0–0.5)
LYMPHOCYTES # BLD AUTO: 0.9 K/UL (ref 1–4.8)
LYMPHOCYTES NFR BLD: 11.3 % (ref 18–48)
MCH RBC QN AUTO: 29.8 PG (ref 27–31)
MCHC RBC AUTO-ENTMCNC: 30.9 G/DL (ref 32–36)
MCV RBC AUTO: 97 FL (ref 82–98)
MONOCYTES # BLD AUTO: 0.8 K/UL (ref 0.3–1)
MONOCYTES NFR BLD: 10 % (ref 4–15)
NEUTROPHILS # BLD AUTO: 5.6 K/UL (ref 1.8–7.7)
NEUTROPHILS NFR BLD: 73.8 % (ref 38–73)
NRBC BLD-RTO: 0 /100 WBC
PLATELET # BLD AUTO: 163 K/UL (ref 150–450)
PMV BLD AUTO: 10 FL (ref 9.2–12.9)
RBC # BLD AUTO: 2.85 M/UL (ref 4.6–6.2)
WBC # BLD AUTO: 7.52 K/UL (ref 3.9–12.7)

## 2022-10-31 PROCEDURE — 43235 EGD DIAGNOSTIC BRUSH WASH: CPT | Performed by: INTERNAL MEDICINE

## 2022-10-31 PROCEDURE — C9113 INJ PANTOPRAZOLE SODIUM, VIA: HCPCS | Performed by: STUDENT IN AN ORGANIZED HEALTH CARE EDUCATION/TRAINING PROGRAM

## 2022-10-31 PROCEDURE — 43235 EGD DIAGNOSTIC BRUSH WASH: CPT | Mod: ,,, | Performed by: INTERNAL MEDICINE

## 2022-10-31 PROCEDURE — 37000008 HC ANESTHESIA 1ST 15 MINUTES: Performed by: INTERNAL MEDICINE

## 2022-10-31 PROCEDURE — 25000003 PHARM REV CODE 250: Performed by: STUDENT IN AN ORGANIZED HEALTH CARE EDUCATION/TRAINING PROGRAM

## 2022-10-31 PROCEDURE — 36415 COLL VENOUS BLD VENIPUNCTURE: CPT | Performed by: HOSPITALIST

## 2022-10-31 PROCEDURE — D9220A PRA ANESTHESIA: ICD-10-PCS | Mod: CRNA,,, | Performed by: STUDENT IN AN ORGANIZED HEALTH CARE EDUCATION/TRAINING PROGRAM

## 2022-10-31 PROCEDURE — 25000003 PHARM REV CODE 250: Performed by: HOSPITALIST

## 2022-10-31 PROCEDURE — 37000009 HC ANESTHESIA EA ADD 15 MINS: Performed by: INTERNAL MEDICINE

## 2022-10-31 PROCEDURE — 63600175 PHARM REV CODE 636 W HCPCS: Performed by: STUDENT IN AN ORGANIZED HEALTH CARE EDUCATION/TRAINING PROGRAM

## 2022-10-31 PROCEDURE — 97165 OT EVAL LOW COMPLEX 30 MIN: CPT

## 2022-10-31 PROCEDURE — D9220A PRA ANESTHESIA: Mod: CRNA,,, | Performed by: STUDENT IN AN ORGANIZED HEALTH CARE EDUCATION/TRAINING PROGRAM

## 2022-10-31 PROCEDURE — 63600175 PHARM REV CODE 636 W HCPCS: Performed by: HOSPITALIST

## 2022-10-31 PROCEDURE — D9220A PRA ANESTHESIA: Mod: ANES,,, | Performed by: ANESTHESIOLOGY

## 2022-10-31 PROCEDURE — 43235 PR EGD, FLEX, DIAGNOSTIC: ICD-10-PCS | Mod: ,,, | Performed by: INTERNAL MEDICINE

## 2022-10-31 PROCEDURE — D9220A PRA ANESTHESIA: ICD-10-PCS | Mod: ANES,,, | Performed by: ANESTHESIOLOGY

## 2022-10-31 PROCEDURE — 11000001 HC ACUTE MED/SURG PRIVATE ROOM

## 2022-10-31 PROCEDURE — 85025 COMPLETE CBC W/AUTO DIFF WBC: CPT | Performed by: HOSPITALIST

## 2022-10-31 PROCEDURE — 25000003 PHARM REV CODE 250: Performed by: INTERNAL MEDICINE

## 2022-10-31 PROCEDURE — 97161 PT EVAL LOW COMPLEX 20 MIN: CPT

## 2022-10-31 RX ORDER — METOPROLOL SUCCINATE 50 MG/1
50 TABLET, EXTENDED RELEASE ORAL DAILY
Status: DISCONTINUED | OUTPATIENT
Start: 2022-10-31 | End: 2022-11-01 | Stop reason: HOSPADM

## 2022-10-31 RX ORDER — FUROSEMIDE 10 MG/ML
20 INJECTION INTRAMUSCULAR; INTRAVENOUS ONCE
Status: COMPLETED | OUTPATIENT
Start: 2022-10-31 | End: 2022-10-31

## 2022-10-31 RX ORDER — PROPOFOL 10 MG/ML
VIAL (ML) INTRAVENOUS
Status: DISCONTINUED | OUTPATIENT
Start: 2022-10-31 | End: 2022-10-31

## 2022-10-31 RX ORDER — PANTOPRAZOLE SODIUM 40 MG/1
40 TABLET, DELAYED RELEASE ORAL 2 TIMES DAILY
Status: DISCONTINUED | OUTPATIENT
Start: 2022-10-31 | End: 2022-11-01 | Stop reason: HOSPADM

## 2022-10-31 RX ORDER — LIDOCAINE HYDROCHLORIDE 20 MG/ML
INJECTION INTRAVENOUS
Status: DISCONTINUED | OUTPATIENT
Start: 2022-10-31 | End: 2022-10-31

## 2022-10-31 RX ORDER — LIDOCAINE HYDROCHLORIDE 20 MG/ML
INJECTION, SOLUTION EPIDURAL; INFILTRATION; INTRACAUDAL; PERINEURAL
Status: DISPENSED
Start: 2022-10-31 | End: 2022-10-31

## 2022-10-31 RX ORDER — PROPOFOL 10 MG/ML
INJECTION, EMULSION INTRAVENOUS
Status: DISPENSED
Start: 2022-10-31 | End: 2022-10-31

## 2022-10-31 RX ADMIN — PROPOFOL 50 MG: 10 INJECTION, EMULSION INTRAVENOUS at 08:10

## 2022-10-31 RX ADMIN — PROPOFOL 20 MG: 10 INJECTION, EMULSION INTRAVENOUS at 08:10

## 2022-10-31 RX ADMIN — SODIUM CHLORIDE: 0.9 INJECTION, SOLUTION INTRAVENOUS at 07:10

## 2022-10-31 RX ADMIN — MUPIROCIN: 20 OINTMENT TOPICAL at 08:10

## 2022-10-31 RX ADMIN — FUROSEMIDE 20 MG: 10 INJECTION, SOLUTION INTRAMUSCULAR; INTRAVENOUS at 05:10

## 2022-10-31 RX ADMIN — AMIODARONE HYDROCHLORIDE 200 MG: 200 TABLET ORAL at 10:10

## 2022-10-31 RX ADMIN — MUPIROCIN: 20 OINTMENT TOPICAL at 10:10

## 2022-10-31 RX ADMIN — ATORVASTATIN CALCIUM 40 MG: 40 TABLET, FILM COATED ORAL at 10:10

## 2022-10-31 RX ADMIN — SODIUM CHLORIDE: 0.9 INJECTION, SOLUTION INTRAVENOUS at 12:10

## 2022-10-31 RX ADMIN — SODIUM CHLORIDE: 0.9 INJECTION, SOLUTION INTRAVENOUS at 08:10

## 2022-10-31 RX ADMIN — METOPROLOL SUCCINATE 50 MG: 50 TABLET, EXTENDED RELEASE ORAL at 05:10

## 2022-10-31 RX ADMIN — LIDOCAINE HYDROCHLORIDE 100 MG: 20 INJECTION, SOLUTION INTRAVENOUS at 08:10

## 2022-10-31 RX ADMIN — PANTOPRAZOLE SODIUM 40 MG: 40 INJECTION, POWDER, FOR SOLUTION INTRAVENOUS at 10:10

## 2022-10-31 RX ADMIN — PANTOPRAZOLE SODIUM 40 MG: 40 TABLET, DELAYED RELEASE ORAL at 08:10

## 2022-10-31 NOTE — TRANSFER OF CARE
"Anesthesia Transfer of Care Note    Patient: Paul Adame    Procedure(s) Performed: Procedure(s) (LRB):  EGD (ESOPHAGOGASTRODUODENOSCOPY) (N/A)    Patient location: GI    Anesthesia Type: general    Transport from OR: Transported from OR on room air with adequate spontaneous ventilation    Post pain: adequate analgesia    Post assessment: no apparent anesthetic complications and tolerated procedure well    Post vital signs: stable    Level of consciousness: awake and alert    Nausea/Vomiting: no nausea/vomiting    Complications: none    Transfer of care protocol was followed      Last vitals:   Visit Vitals  BP (!) 110/56   Pulse 100   Temp 36.7 °C (98 °F) (Oral)   Resp 18   Ht 5' 11" (1.803 m)   Wt 83.5 kg (184 lb)   SpO2 100%   BMI 25.66 kg/m²     "

## 2022-10-31 NOTE — ANESTHESIA PREPROCEDURE EVALUATION
10/31/2022  Paul Adame is a 87 y.o., male.      Pre-op Assessment     I have reviewed the Nursing Notes.       Review of Systems  Anesthesia Hx:  No problems with previous Anesthesia    Social:  Former Smoker    Cardiovascular:   Denies Pacemaker. Hypertension CAD  asymptomatic  Denies CABG/stent. Dysrhythmias atrial fibrillation hyperlipidemia    Pulmonary:  Pulmonary Normal    Renal/:  Renal/ Normal     Hepatic/GI:   No Bowel Prep. Denies Liver Disease. Denies Hepatitis. GI bleed without hematemesis   Musculoskeletal:   Arthritis     Neurological:  Neurology Normal    Endocrine:  Endocrine Normal        Physical Exam  General: Well nourished, Cooperative, Alert and Oriented    Airway:  Mallampati: III   Mouth Opening: Normal  TM Distance: Normal  Tongue: Normal  Neck ROM: Normal ROM        Anesthesia Plan  Type of Anesthesia, risks & benefits discussed:    Anesthesia Type: Gen Natural Airway  Intra-op Monitoring Plan: Standard ASA Monitors  Induction:  IV  Informed Consent: Informed consent signed with the Patient and all parties understand the risks and agree with anesthesia plan.  All questions answered.   ASA Score: 3  Day of Surgery Review of History & Physical: H&P Update referred to the surgeon/provider.    Ready For Surgery From Anesthesia Perspective.     .

## 2022-10-31 NOTE — NURSING
10/31/22 0815 Patient to endoscopy at this time.     10/31/22 1005 Patient arrives back to the unit at this time.

## 2022-10-31 NOTE — SUBJECTIVE & OBJECTIVE
Interval History: Seen post EGD. Eating lunch. Has swelling in both arms. No other complaints.     Review of Systems   Constitutional:  Negative for chills and fever.   Respiratory:  Negative for shortness of breath.    Cardiovascular:  Negative for chest pain.   Gastrointestinal:  Negative for abdominal pain.   Genitourinary:  Negative for difficulty urinating.   Neurological:  Negative for weakness and numbness.   Psychiatric/Behavioral:  Negative for confusion.    Objective:     Vital Signs (Most Recent):  Temp: 97.8 °F (36.6 °C) (10/31/22 1105)  Pulse: 93 (10/31/22 1105)  Resp: 16 (10/31/22 1105)  BP: (!) 122/59 (10/31/22 1105)  SpO2: 96 % (10/31/22 1105)   Vital Signs (24h Range):  Temp:  [97.8 °F (36.6 °C)-98.4 °F (36.9 °C)] 97.8 °F (36.6 °C)  Pulse:  [] 93  Resp:  [15-18] 16  SpO2:  [92 %-100 %] 96 %  BP: (107-146)/(53-66) 122/59     Weight: 83.5 kg (184 lb)  Body mass index is 25.66 kg/m².    Intake/Output Summary (Last 24 hours) at 10/31/2022 1345  Last data filed at 10/31/2022 0811  Gross per 24 hour   Intake 240 ml   Output --   Net 240 ml      Physical Exam  Vitals and nursing note reviewed.   Constitutional:       General: He is not in acute distress.     Appearance: He is ill-appearing. He is not toxic-appearing.   HENT:      Head: Normocephalic and atraumatic.      Nose: Nose normal.      Mouth/Throat:      Mouth: Mucous membranes are moist.   Cardiovascular:      Rate and Rhythm: Normal rate. Rhythm irregular.      Pulses: Normal pulses.      Heart sounds: Normal heart sounds. No murmur heard.    No gallop.   Pulmonary:      Effort: Pulmonary effort is normal. No respiratory distress.      Breath sounds: Normal breath sounds. No wheezing or rales.      Comments: Room air  Abdominal:      General: Bowel sounds are normal. There is no distension.      Palpations: Abdomen is soft.      Tenderness: There is no abdominal tenderness. There is no guarding.   Musculoskeletal:         General: Swelling  present.      Right lower leg: No edema.      Left lower leg: No edema.   Skin:     General: Skin is warm and dry.   Neurological:      Mental Status: He is alert. Mental status is at baseline.       Significant Labs: All pertinent labs within the past 24 hours have been reviewed.    Significant Imaging: I have reviewed all pertinent imaging results/findings within the past 24 hours.

## 2022-10-31 NOTE — ASSESSMENT & PLAN NOTE
Admitted with upper GI bleeding. EGD at OSH with poor visualization due to blood  - Hgb stable- repeat CBC in AM  - EGD 10/31 with non bleeding gastric ulcer  - continues on PPI- change to PO BID  - follow up with GI for repeat EGD in 2 months for healing  - will resume eliquis and brilinta tomorrow

## 2022-10-31 NOTE — PT/OT/SLP PROGRESS
Physical Therapy      Patient Name:  Paul Adame   MRN:  [id number]    Patient not seen today secondary to Off the floor for procedure/surgery (EGD). Will follow-up tomorrow.

## 2022-10-31 NOTE — PLAN OF CARE
[facility] - Med Surg  Initial Discharge Assessment    Patient from home and lives with spouse. Pt stated his two daughters live near by and will be his help at home. Pt currently has cane. TN to continue to follow for discharge needs.        Primary Care Provider: Fredo Lemus MD    Admission Diagnosis: GI bleed [K92.2]    Admission Date: 10/29/2022  Expected Discharge Date:     Discharge Barriers Identified: None    Payor: MEDICARE / Plan: MEDICARE PART A & B / Product Type: Government /     Extended Emergency Contact Information  Primary Emergency Contact: Barb Adame   [place]  Mobile Phone: [phone]  Relation: Daughter  Preferred language: English   needed? No    Discharge Plan A: Home  Discharge Plan B: Home with family      [facility], [place] - [address] [facility]  [address] [facility] [address]  Phone: [phone] Fax: [phone]    [id number] [place] [facility] [address] [facility] [address]  [address]  [place] [address]  Phone: [phone] Fax: [phone]      Initial Assessment (most recent)       Adult Discharge Assessment - 10/31/22 1202          Discharge Assessment    Assessment Type Discharge Planning Assessment     Confirmed/corrected address, phone number and insurance Yes     Confirmed Demographics Correct on Facesheet     Source of Information patient;family     When was your last doctors appointment? --   About 3 weeks ago    Does patient/caregiver understand observation status No     Was observation education provided? No     Communicated HUNTER with patient/caregiver Yes     Reason For Admission GI hemorrhage     Lives With spouse     Facility Arrived From: Home     Do you expect to return to your current living situation? Yes     Do you have help at home or someone to help you manage your care at home? Yes     Who are your caregiver(s) and their phone number(s)?  Barb Adame (Daughter)   [phone]     Prior to hospitilization cognitive status: Alert/Oriented     Current cognitive status: Alert/Oriented     Walking or Climbing Stairs Difficulty none     Dressing/Bathing Difficulty none     Equipment Currently Used at Home cane, quad     Readmission within 30 days? No     Patient currently being followed by outpatient case management? No     Do you take prescription medications? No     Do you have prescription coverage? Yes     Coverage Medicare     Do you have any problems affording any of your prescribed medications? No     Is the patient taking medications as prescribed? yes     Who is going to help you get home at discharge? Barb Adame (Daughter)   [phone]     How do you get to doctors appointments? family or friend will provide     Are you on dialysis? No     Do you take coumadin? No     Discharge Plan A Home     Discharge Plan B Home with family     DME Needed Upon Discharge  none     Discharge Plan discussed with: Spouse/sig other;Patient;Adult children     Name(s) and Number(s) Barb Adame (Daughter)   [phone]     Discharge Barriers Identified None        Physical Activity    On average, how many days per week do you engage in moderate to strenuous exercise (like a brisk walk)? 0 days     On average, how many minutes do you engage in exercise at this level? 0 min        Financial Resource Strain    How hard is it for you to pay for the very basics like food, housing, medical care, and heating? Not hard at all        Housing Stability    In the last 12 months, was there a time when you were not able to pay the mortgage or rent on time? No     In the last 12 months, how many places have you lived? 1     In the last 12 months, was there a time when you did not have a steady place to sleep or slept in a shelter (including now)? No        Transportation Needs    In the past 12 months, has lack of transportation kept you from medical appointments or from getting  medications? No     In the past 12 months, has lack of transportation kept you from meetings, work, or from getting things needed for daily living? No        Food Insecurity    Within the past 12 months, you worried that your food would run out before you got the money to buy more. Never true     Within the past 12 months, the food you bought just didn't last and you didn't have money to get more. Never true        Stress    Do you feel stress - tense, restless, nervous, or anxious, or unable to sleep at night because your mind is troubled all the time - these days? Only a little        Social Connections    In a typical week, how many times do you talk on the phone with family, friends, or neighbors? Never     How often do you get together with friends or relatives? Once a week     Are you , , , , never , or living with a partner?         Alcohol Use    Q1: How often do you have a drink containing alcohol? Monthly or less     Q2: How many drinks containing alcohol do you have on a typical day when you are drinking? 1 or 2     Q3: How often do you have six or more drinks on one occasion? Never        Relationship/Environment    Name(s) of Who Lives With Patient Aleja Adame (wife)

## 2022-10-31 NOTE — NURSING
Pt resting in bed. Son remains at bedside. Remained free from fall/injury. Cont IVF. Repositions with assist. Mepilex changed to sacral area. Scdeduled medications given. Safety measures maintained will cont to monitor

## 2022-10-31 NOTE — PT/OT/SLP PROGRESS
Occupational Therapy      Patient Name:  Paul Adame   MRN:  [id number]    Patient not seen today secondary to Other (MAN[facility] for EGD). Will follow-up later as able.    10/31/2022

## 2022-10-31 NOTE — ASSESSMENT & PLAN NOTE
On eliquis, asa, brillinta as outpatient  Will plan to resume eliquis and brillinta tomorrow   Continue statin

## 2022-10-31 NOTE — PT/OT/SLP EVAL
Physical Therapy Evaluation    Patient Name:  Paul Adame   MRN:  [id number]    Recommendations:     Discharge Recommendations:  home   Discharge Equipment Recommendations: none     Assessment:     Paul Adame is a 87 y.o. male admitted with a medical diagnosis of Acute GI bleeding.  He presents with the following impairments/functional limitations:  impaired functional mobility, gait instability .    Rehab Prognosis: Good; patient would benefit from acute skilled PT services to address these deficits and reach maximum level of function.    Recent Surgery: Procedure(s) (LRB):  EGD (ESOPHAGOGASTRODUODENOSCOPY) (N/A) Day of Surgery    Plan:     During this hospitalization, patient to be seen 5 x/week to address the identified rehab impairments via gait training, therapeutic activities, therapeutic exercises and progress toward the following goals:    Plan of Care Expires:  11/04/22    Subjective     Chief Complaint: None  Patient/Family Comments/goals: Pt agreeable to PT eval.  Pain/Comfort:  Pain Rating 1: 0/10    Patients cultural, spiritual, Quaker conflicts given the current situation: no    Living Environment:  PTA pt lived with his wife in a 1 story house with no steps to enter.  Prior to admission, patients level of function was mod I.  Equipment used at home: cane, quad (occasionally) and shower chair.  DME owned (not currently used): rolling walker and rollator .  Upon discharge, patient will have assistance from family.    Objective:     Communicated with nurseIrene prior to session.  Patient found up in chair with  (nothing)  upon PT entry to room.    General Precautions: Standard,     Orthopedic Precautions:N/A   Braces: N/A  Respiratory Status: Room air    Exams:  Cognitive Exam:  Patient is oriented to Person, Place, and Situation  RLE ROM: WFL  RLE Strength: WFL  LLE ROM: WFL  LLE Strength: WFL    Functional Mobility:  Transfers:     Sit to Stand:  stand by assistance with rolling walker  Gait: 70'  w/RW SBA  Balance: Good static/Fair+ dynamic standing      AM-PAC 6 CLICK MOBILITY  Total Score:21       Treatment & Education:  Educated on role of PT and POC.    Patient left up in chair with call button in reach, family present, and all needs in reach .    GOALS:   Multidisciplinary Problems       Physical Therapy Goals          Problem: Physical Therapy    Goal Priority Disciplines Outcome Goal Variances Interventions   Physical Therapy Goal     PT, PT/OT Ongoing, Progressing     Description: Goals to be met by: 22     Patient will increase functional independence with mobility by performin. Pt to be mod I with bed mobility.  2. Pt to transfer with supervision.  3. Pt to ambulate 150' /c or /s RW SBA.  4. Pt to be (I) with written HEP.                         History:     Past Medical History:   Diagnosis Date    Anticoagulant long-term use     Arthritis     Coronary artery disease     Primary hypertension 10/29/2022       Past Surgical History:   Procedure Laterality Date    ABDOMINAL SURGERY      APPENDECTOMY      BACK SURGERY  1974    CARDIAC SURGERY      Triple Bypass    CHOLECYSTECTOMY      EYE SURGERY      cataracts    VASCULAR SURGERY         Time Tracking:     PT Received On: 10/31/22  PT Start Time: 1300     PT Stop Time: 1309  PT Total Time (min): 9 min     Billable Minutes: Evaluation 9      10/31/2022

## 2022-10-31 NOTE — PT/OT/SLP EVAL
Occupational Therapy   Evaluation    Name: Paul Adame  MRN: [id number]  Admitting Diagnosis:  Gastrointestinal hemorrhage associated with gastric ulcer  Recent Surgery: Procedure(s) (LRB):  EGD (ESOPHAGOGASTRODUODENOSCOPY) (N/A) Day of Surgery    Recommendations:     Discharge Recommendations: home (with family support)  Discharge Equipment Recommendations:  none  Barriers to discharge:  None    Assessment:     Paul Adame is a 87 y.o. male with a medical diagnosis of Gastrointestinal hemorrhage associated with gastric ulcer. Performance deficits affecting function: edema, impaired skin, decreased upper extremity function, decreased ROM, impaired functional mobility.      OT rec home with family at d/c. Pt participatory with BUE AROM HEP with supportive family present.     Rehab Prognosis: Good; patient would benefit from acute skilled OT services to address these deficits and reach maximum level of function.       Plan:     Patient to be seen 3 x/week to address the above listed problems via self-care/home management, therapeutic activities, therapeutic exercises  Plan of Care Expires: 11/14/22  Plan of Care Reviewed with: patient, spouse, daughter    Subjective     Chief Complaint: BUE swelling   Patient/Family Comments/goals: pt agreeable to sit up in the chair and complete exercises     Occupational Profile:  Living Environment: pt lives with his wife in a [facility] with 0 TIM.   Previous level of function: MOD I with use of quad cane prn. MOD I with ADLs   Roles and Routines: likes to play cards with friends   Equipment Used at Home:  walker, rolling, rollator, shower chair, cane, quad  Assistance upon Discharge: wife, daughter    Pain/Comfort:  Pain Rating 1: 0/10    Patients cultural, spiritual, Orthodox conflicts given the current situation: no    Objective:     Patient found HOB elevated with peripheral IV upon OT entry to room.    General Precautions: Standard, fall   Orthopedic Precautions:N/A   Braces:  N/A  Respiratory Status: Room air; spO2 94-95%,  bpm with activity     Occupational Performance:    Bed Mobility:    Patient completed Scooting with supervision  Patient completed Supine to Sit with supervision    Functional Mobility/Transfers:  Patient completed Sit <> Stand Transfer with stand by assistance  with  rolling walker   Patient completed Toilet Transfer Step Transfer technique with stand by assistance with  rolling walker  Functional Mobility: Gait belt donned prior to transfer for safety during mobility/transfers. Pt completed in-room and bathroom functional mobility using RW with SBA. Pt practiced navigating RW around the door with SBA. Verbal cueing for safe hand placement with functional transfer. No dizziness reported.     Activities of Daily Living:  Upper Body Dressing: minimum assistance to don back gown while seated unsupported at EOB  Lower Body Dressing: stand by assistance seated EOB to doff/don L sock    Cognitive/Visual Perceptual:  Cognitive/Psychosocial Skills:     -       Follows Commands/attention:Follows multistep  commands  -       Communication: clear/fluent  -       Memory: No Deficits noted  -       Safety awareness/insight to disability: intact   -       Mood/Affect/Coping skills/emotional control: Cooperative and Pleasant  Visual/Perceptual:      -Intact  R/L discrimination      Physical Exam:  Balance:    -       seated: SUP; standing: SBA with RW  Postural examination/scapula alignment:    -       Rounded shoulders  -       Forward head  Skin integrity: Visible skin intact  Edema:  Mild BUE  Sensation:    -       Intact  light/touch BUE  Dominant hand:    -       Right  Upper Extremity Range of Motion:     -       Right Upper Extremity: WFL  -       Left Upper Extremity: WFL  Upper Extremity Strength:    -       Right Upper Extremity: WFL  -       Left Upper Extremity: WFL   Strength:    -       Right Upper Extremity: WFL  -       Left Upper Extremity: WFL  Fine  Motor Coordination:    -       Intact  Left hand, manipulation of objects and Right hand, manipulation of objects  Gross motor coordination:   WFL    AMPAC 6 Click ADL:  AMPAC Total Score: 24    Treatment & Education:  Pt educated on OT role/POC  Importance of OOB activity with staff assistance  Safety during functional t/f and mobility    Multiple self-care tasks/functional mobility completed- assistance level noted above   Handout provided to pt and family to review on home safety tips  Pt completed 1 x 15 BUE AROM in seated position:  Digits I-V flexion/extension   Wrist flexion/extension  Forearm pronation/supination  Elbow flexion/extension  Shoulder flexion/extension (x10- OT demo to modify RUE to lower height)   Shoulder elevation/depression x5  Forward punches x5  Pt encouraged to complete 3x15 reps a day  All questions/concerns answered within OT scope of practice       Patient left up in chair with all lines intact, call button in reach, nurse, Yelitza, notified, wife and daughter present, and all needs met/within reach    GOALS:   Multidisciplinary Problems       Occupational Therapy Goals          Problem: Occupational Therapy    Goal Priority Disciplines Outcome Interventions   Occupational Therapy Goal     OT, PT/OT Ongoing, Progressing    Description: Goals to be met by: 11/14/22     Patient will increase functional independence with ADLs by performing:    LE Dressing with Modified [place].  Grooming while standing at sink with Modified [place].  Toileting from toilet with Modified [place] for hygiene and clothing management.   Step transfer with Modified [place]  Toilet transfer to toilet with Modified [place].  Upper extremity exercise program x15 reps per handout, with independence.                         History:     Past Medical History:   Diagnosis Date    Anticoagulant long-term use     Arthritis     Coronary artery disease     Primary hypertension 10/29/2022          Past Surgical History:   Procedure Laterality Date    ABDOMINAL SURGERY      APPENDECTOMY      BACK SURGERY  1974    CARDIAC SURGERY      Triple Bypass    CHOLECYSTECTOMY      EYE SURGERY      cataracts    VASCULAR SURGERY         Time Tracking:     OT Date of Treatment: 10/31/22  OT Start Time: 1526  OT Stop Time: 1541  OT Total Time (min): 15 min    Billable Minutes:Evaluation 15 min    10/31/2022

## 2022-10-31 NOTE — OR NURSING
PROCEDURE AND RECOVERY COMPLETED. DR. BENSON  DISCUSSED FINDINGS AND PLAN OF CARE WITH PATIENT AND FAMILY; REPORT CALLED TO FREDIS NEWMAN; PATIENT DENIES ANY CONCERNS; READY TO TRANSPORT TO ROOM

## 2022-10-31 NOTE — PLAN OF CARE
10/31/22 1205   Medicare Message   Important Message from Medicare regarding Discharge Appeal Rights Given to patient/caregiver;Explained to patient/caregiver;Signed/date by patient/caregiver   Date IMM was signed 10/31/22   Time IMM was signed 1203

## 2022-10-31 NOTE — PROGRESS NOTES
[facility] Medicine  Progress Note    Patient Name: Paul Adame  MRN: [id number]  Patient Class: IP- Inpatient   Admission Date: 10/29/2022  Length of Stay: 2 days  Attending Physician: Jazlyn Morrow MD  Primary Care Provider: Fredo Lemus MD        Subjective:     Principal Problem:Gastrointestinal hemorrhage associated with gastric ulcer        HPI:  Per transfer  note,88 yo on eliquis for atrial fibrillation presented to outside hospital with hematemeis. 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 Admitted with Hgb 11.7 and dropped as low as 6.9. He has received multiple transfusions, inculding two today after his EGD. As lesion difficult to ID and treat. There is no IR available or other specialist services at current facility that can assist with evaluation and treatment. Dr. Polk spoke to Dr. Rashawn Lundberg, who recommended he be initially evaluated by general GI services. No longer having active hematemesis but Hgb keeps dropping. Hgb WBC 9.9, Hgb 7.1, Plt 164, Na 147, K 3.8, Cl 120, CO2 22, BUN 39, SCr 0.9, Glc 122, Ca 7.5.. Vitals from this morning T 96.9 P101 resp 18 BP 89/52 100% on RA,  On my evaluations in [facility],family say they have been told ,patient gong to see  for advance endoscopy,spoke with transfer center,Connor from Transfer center say per record,no plan for advance endoscopy per ,,patient only need be seen by general GI,which I did and GI on call was informed.patient has no active bleeding at this time,byu had hematemesis and melena this  morning.      Overview/Hospital Course:  Mr Paul Adame was admitted for upper GI bleeding. Patient was outside transfer for possible EUS.  Patient at other hospital required multiple units of blood. EGD poor views due to blood. GI consulted here. Repeat EGD 10/31 showed non bleeding gastric ulcer. Hgb remains  stable. Resumed diet.       Interval History: Seen post EGD. Eating lunch. Has swelling in both arms. No other complaints.     Review of Systems   Constitutional:  Negative for chills and fever.   Respiratory:  Negative for shortness of breath.    Cardiovascular:  Negative for chest pain.   Gastrointestinal:  Negative for abdominal pain.   Genitourinary:  Negative for difficulty urinating.   Neurological:  Negative for weakness and numbness.   Psychiatric/Behavioral:  Negative for confusion.    Objective:     Vital Signs (Most Recent):  Temp: 97.8 °F (36.6 °C) (10/31/22 1105)  Pulse: 93 (10/31/22 1105)  Resp: 16 (10/31/22 1105)  BP: (!) 122/59 (10/31/22 1105)  SpO2: 96 % (10/31/22 1105)   Vital Signs (24h Range):  Temp:  [97.8 °F (36.6 °C)-98.4 °F (36.9 °C)] 97.8 °F (36.6 °C)  Pulse:  [] 93  Resp:  [15-18] 16  SpO2:  [92 %-100 %] 96 %  BP: (107-146)/(53-66) 122/59     Weight: 83.5 kg (184 lb)  Body mass index is 25.66 kg/m².    Intake/Output Summary (Last 24 hours) at 10/31/2022 1345  Last data filed at 10/31/2022 0811  Gross per 24 hour   Intake 240 ml   Output --   Net 240 ml      Physical Exam  Vitals and nursing note reviewed.   Constitutional:       General: He is not in acute distress.     Appearance: He is ill-appearing. He is not toxic-appearing.   HENT:      Head: Normocephalic and atraumatic.      Nose: Nose normal.      Mouth/Throat:      Mouth: Mucous membranes are moist.   Cardiovascular:      Rate and Rhythm: Normal rate. Rhythm irregular.      Pulses: Normal pulses.      Heart sounds: Normal heart sounds. No murmur heard.    No gallop.   Pulmonary:      Effort: Pulmonary effort is normal. No respiratory distress.      Breath sounds: Normal breath sounds. No wheezing or rales.      Comments: Room air  Abdominal:      General: Bowel sounds are normal. There is no distension.      Palpations: Abdomen is soft.      Tenderness: There is no abdominal tenderness. There is no guarding.    Musculoskeletal:         General: Swelling present.      Right lower leg: No edema.      Left lower leg: No edema.   Skin:     General: Skin is warm and dry.   Neurological:      Mental Status: He is alert. Mental status is at baseline.       Significant Labs: All pertinent labs within the past 24 hours have been reviewed.    Significant Imaging: I have reviewed all pertinent imaging results/findings within the past 24 hours.      Assessment/Plan:      * Gastrointestinal hemorrhage associated with gastric ulcer  Admitted with upper GI bleeding. EGD at OSH with poor visualization due to blood  - Hgb stable- repeat CBC in AM  - EGD 10/31 with non bleeding gastric ulcer  - continues on PPI- change to PO BID  - follow up with GI for repeat EGD in 2 months for healing  - will resume eliquis and brilinta tomorrow      Debility  PT, OT eval       Primary hypertension  BP normal  Resume metoprolol  Will continue to hold lisinopril and imdur for now         Atrial fibrillation, chronic  Hold eliquis for GI bleeding  Resume metoprolol  Continue amiodarone         Acute blood loss anemia  Due to GI bleeding. Hgb stable  - CBC in AM      Coronary artery disease involving native coronary artery without angina pectoris  On eliquis, asa, brillinta as outpatient  Will plan to resume eliquis and brillinta tomorrow   Continue statin        VTE Risk Mitigation (From admission, onward)         Ordered     Place sequential compression device  Until discontinued         10/30/22 0943                Discharge Planning   HUNTER:      Code Status: Full Code   Is the patient medically ready for discharge?:     Reason for patient still in hospital (select all that apply): Patient trending condition         Plan discussed with patient and wife.             Jazlyn Morrow MD  Department of Hospital Medicine   [facility]

## 2022-10-31 NOTE — PROVATION PATIENT INSTRUCTIONS
Discharge Summary/Instructions after an Endoscopic Procedure  Patient Name: Paul Adame  Patient MRN: [id number]  Patient YOB: 1935     Monday, October 31, 2022  Randy Laird MD  Dear patient,  As a result of recent federal legislation (The Federal Cures Act), you may   receive lab or pathology results from your procedure in your [facility]   account before your physician is able to contact you. Your physician or   their representative will relay the results to you with their   recommendations at their soonest availability.  Thank you,  RESTRICTIONS:  During your procedure today, you received medications for sedation.  These   medications may affect your judgment, balance and coordination.  Therefore,   for 24 hours, you have the following restrictions:   - DO NOT drive a car, operate machinery, make legal/financial decisions,   sign important papers or drink alcohol.    ACTIVITY:  Today: no heavy lifting, straining or running due to procedural   sedation/anesthesia.  The following day: return to full activity including work.  DIET:  Eat and drink normally unless instructed otherwise.     TREATMENT FOR COMMON SIDE EFFECTS:  - Mild abdominal pain, nausea, belching, bloating or excessive gas:  rest,   eat lightly and use a heating pad.  - Sore Throat: treat with throat lozenges and/or gargle with warm salt   water.  - Because air was used during the procedure, expelling large amounts of air   from your rectum or belching is normal.  - If a bowel prep was taken, you may not have a bowel movement for 1-3 days.    This is normal.  SYMPTOMS TO WATCH FOR AND REPORT TO YOUR PHYSICIAN:  1. Abdominal pain or bloating, other than gas cramps.  2. Chest pain.  3. Back pain.  4. Signs of infection such as: chills or fever occurring within 24 hours   after the procedure.  5. Rectal bleeding, which would show as bright red, maroon, or black stools.   (A tablespoon of blood from the rectum is not serious, especially if    hemorrhoids are present.)  6. Vomiting.  7. Weakness or dizziness.  GO DIRECTLY TO THE NEAREST EMERGENCY ROOM IF YOU HAVE ANY OF THE FOLLOWING:      Difficulty breathing              Chills and/or fever over 101 F   Persistent vomiting and/or vomiting blood   Severe abdominal pain   Severe chest pain   Black, tarry stools   Bleeding- more than one tablespoon   Any other symptom or condition that you feel may need urgent attention  Your doctor recommends these additional instructions:  If any biopsies were taken, your doctors clinic will contact you in 1 to 2   weeks with any results.  - Return patient to hospital trent for ongoing care.   - Advance diet as tolerated.   - Continue present medications.   - Use Protonix (pantoprazole) 40 mg PO BID.   - Resume Eliquis (apixaban) at prior dose tomorrow.   - Repeat upper endoscopy in 2 months to check healing.  For questions, problems or results please call your physician - Randy Laird MD at Work:  [phone].  [facility] can be reached at [phone]     IF A COMPLICATION OR EMERGENCY SITUATION ARISES AND YOU ARE UNABLE TO REACH   YOUR PHYSICIAN - GO DIRECTLY TO THE EMERGENCY ROOM.  Randy Laird MD  10/31/2022 9:08:48 AM  This report has been verified and signed electronically.  Dear patient,  As a result of recent federal legislation (The Federal Cures Act), you may   receive lab or pathology results from your procedure in your [facility]   account before your physician is able to contact you. Your physician or   their representative will relay the results to you with their   recommendations at their soonest availability.  Thank you,  PROVATION

## 2022-10-31 NOTE — PLAN OF CARE
Problem: Physical Therapy  Goal: Physical Therapy Goal  Description: Goals to be met by: 22     Patient will increase functional independence with mobility by performin. Pt to be mod I with bed mobility.  2. Pt to transfer with supervision.  3. Pt to ambulate 150' /c or /s RW SBA.  4. Pt to be (I) with written HEP.    Outcome: Ongoing, Progressing   Initial eval completed.  See in chart for details.

## 2022-11-01 ENCOUNTER — TELEPHONE (OUTPATIENT)
Dept: GASTROENTEROLOGY | Facility: CLINIC | Age: 87
End: 2022-11-01
Payer: MEDICARE

## 2022-11-01 VITALS
RESPIRATION RATE: 20 BRPM | TEMPERATURE: 96 F | WEIGHT: 184 LBS | HEART RATE: 82 BPM | BODY MASS INDEX: 25.76 KG/M2 | OXYGEN SATURATION: 97 % | SYSTOLIC BLOOD PRESSURE: 109 MMHG | HEIGHT: 71 IN | DIASTOLIC BLOOD PRESSURE: 56 MMHG

## 2022-11-01 LAB
ANION GAP SERPL CALC-SCNC: 10 MMOL/L (ref 8–16)
BASOPHILS # BLD AUTO: 0.05 K/UL (ref 0–0.2)
BASOPHILS NFR BLD: 0.6 % (ref 0–1.9)
BUN SERPL-MCNC: 7 MG/DL (ref 8–23)
CALCIUM SERPL-MCNC: 8.3 MG/DL (ref 8.7–10.5)
CHLORIDE SERPL-SCNC: 114 MMOL/L (ref 95–110)
CO2 SERPL-SCNC: 22 MMOL/L (ref 23–29)
CREAT SERPL-MCNC: 0.8 MG/DL (ref 0.5–1.4)
DIFFERENTIAL METHOD: ABNORMAL
EOSINOPHIL # BLD AUTO: 0.3 K/UL (ref 0–0.5)
EOSINOPHIL NFR BLD: 3.7 % (ref 0–8)
ERYTHROCYTE [DISTWIDTH] IN BLOOD BY AUTOMATED COUNT: 19 % (ref 11.5–14.5)
EST. GFR  (NO RACE VARIABLE): >60 ML/MIN/1.73 M^2
GLUCOSE SERPL-MCNC: 118 MG/DL (ref 70–110)
HCT VFR BLD AUTO: 27.8 % (ref 40–54)
HGB BLD-MCNC: 9 G/DL (ref 14–18)
IMM GRANULOCYTES # BLD AUTO: 0.08 K/UL (ref 0–0.04)
IMM GRANULOCYTES NFR BLD AUTO: 1 % (ref 0–0.5)
LYMPHOCYTES # BLD AUTO: 1 K/UL (ref 1–4.8)
LYMPHOCYTES NFR BLD: 12.9 % (ref 18–48)
MCH RBC QN AUTO: 31.3 PG (ref 27–31)
MCHC RBC AUTO-ENTMCNC: 32.4 G/DL (ref 32–36)
MCV RBC AUTO: 97 FL (ref 82–98)
MONOCYTES # BLD AUTO: 0.8 K/UL (ref 0.3–1)
MONOCYTES NFR BLD: 10.7 % (ref 4–15)
NEUTROPHILS # BLD AUTO: 5.5 K/UL (ref 1.8–7.7)
NEUTROPHILS NFR BLD: 71.1 % (ref 38–73)
NRBC BLD-RTO: 0 /100 WBC
PLATELET # BLD AUTO: 182 K/UL (ref 150–450)
PMV BLD AUTO: 9.9 FL (ref 9.2–12.9)
POTASSIUM SERPL-SCNC: 3.1 MMOL/L (ref 3.5–5.1)
RBC # BLD AUTO: 2.88 M/UL (ref 4.6–6.2)
SODIUM SERPL-SCNC: 146 MMOL/L (ref 136–145)
WBC # BLD AUTO: 7.74 K/UL (ref 3.9–12.7)

## 2022-11-01 PROCEDURE — 85025 COMPLETE CBC W/AUTO DIFF WBC: CPT | Performed by: HOSPITALIST

## 2022-11-01 PROCEDURE — 25000003 PHARM REV CODE 250: Performed by: HOSPITALIST

## 2022-11-01 PROCEDURE — 80048 BASIC METABOLIC PNL TOTAL CA: CPT | Performed by: HOSPITALIST

## 2022-11-01 PROCEDURE — 36415 COLL VENOUS BLD VENIPUNCTURE: CPT | Performed by: HOSPITALIST

## 2022-11-01 RX ORDER — PANTOPRAZOLE SODIUM 40 MG/1
40 TABLET, DELAYED RELEASE ORAL 2 TIMES DAILY
Qty: 120 TABLET | Refills: 0 | Status: SHIPPED | OUTPATIENT
Start: 2022-11-01 | End: 2022-12-31

## 2022-11-01 RX ORDER — POTASSIUM CHLORIDE 20 MEQ/1
40 TABLET, EXTENDED RELEASE ORAL ONCE
Status: COMPLETED | OUTPATIENT
Start: 2022-11-01 | End: 2022-11-01

## 2022-11-01 RX ADMIN — APIXABAN 5 MG: 5 TABLET, FILM COATED ORAL at 10:11

## 2022-11-01 RX ADMIN — MUPIROCIN: 20 OINTMENT TOPICAL at 08:11

## 2022-11-01 RX ADMIN — AMIODARONE HYDROCHLORIDE 200 MG: 200 TABLET ORAL at 08:11

## 2022-11-01 RX ADMIN — METOPROLOL SUCCINATE 50 MG: 50 TABLET, EXTENDED RELEASE ORAL at 08:11

## 2022-11-01 RX ADMIN — POTASSIUM CHLORIDE 40 MEQ: 1500 TABLET, EXTENDED RELEASE ORAL at 10:11

## 2022-11-01 RX ADMIN — ATORVASTATIN CALCIUM 40 MG: 40 TABLET, FILM COATED ORAL at 08:11

## 2022-11-01 RX ADMIN — PANTOPRAZOLE SODIUM 40 MG: 40 TABLET, DELAYED RELEASE ORAL at 08:11

## 2022-11-01 NOTE — DISCHARGE SUMMARY
[facility] Medicine  Discharge Summary      Patient Name: Paul Adame  MRN: [id number]  Patient Class: IP- Inpatient  Admission Date: 10/29/2022  Hospital Length of Stay: 3 days  Discharge Date and Time:  11/01/2022 9:38 AM  Attending Physician: Jazlyn Morrow MD   Discharging Provider: Jazlyn Morrow MD  Primary Care Provider: Fredo Lemus MD      HPI:   Per transfer  note,88 yo on eliquis for atrial fibrillation presented to outside hospital with hematemeis. 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 Admitted with Hgb 11.7 and dropped as low as 6.9. He has received multiple transfusions, inculding two today after his EGD. As lesion difficult to ID and treat. There is no IR available or other specialist services at current facility that can assist with evaluation and treatment. Dr. Polk spoke to Dr. Rashawn Lundberg, who recommended he be initially evaluated by general GI services. No longer having active hematemesis but Hgb keeps dropping. Hgb WBC 9.9, Hgb 7.1, Plt 164, Na 147, K 3.8, Cl 120, CO2 22, BUN 39, SCr 0.9, Glc 122, Ca 7.5.. Vitals from this morning T 96.9 P101 resp 18 BP 89/52 100% on RA,  On my evaluations in [facility],family say they have been told ,patient gong to see  for advance endoscopy,spoke with transfer center,Connor from Transfer center say per record,no plan for advance endoscopy per ,,patient only need be seen by general GI,which I did and GI on call was informed.patient has no active bleeding at this time,byu had hematemesis and melena this  morning.      Procedure(s) (LRB):  EGD (ESOPHAGOGASTRODUODENOSCOPY) (N/A)      Hospital Course:   Mr Paul Adame was admitted for upper GI bleeding. Patient was outside transfer for possible EUS.  Patient at other hospital required multiple units of blood. EGD poor views due to blood. GI consulted here.  Repeat EGD 10/31 showed non bleeding gastric ulcer. Hgb remains stable. Resumed diet. Resumed eliquis. No further signs of bleeding. Stable for discharge to home with pantoprazole 40mg PO BID x2 months. Repeat EGD in 2 months. Follow up with Cardiology to discuss if asa is necessary. Follow up with PCP for repeat CBC. He is also s/p R eye surgery after prior shingles and still has stitches in place- follow up with Ophtho already scheduled for tomorrow. Plan discussed with patient and daughter.        Goals of Care Treatment Preferences:  Code Status: Full Code      Consults:   Consults (From admission, onward)        Status Ordering Provider     Inpatient consult to Gastroenterology  Once        Provider:  Loretta Lentz MD    Completed THANH PATEL          No new Assessment & Plan notes have been filed under this hospital service since the last note was generated.  Service: Hospital Medicine    Final Active Diagnoses:    Diagnosis Date Noted POA    PRINCIPAL PROBLEM:  Gastrointestinal hemorrhage associated with gastric ulcer [K25.4] 10/29/2022 Yes    Debility [R53.81] 10/30/2022 Yes    Acute blood loss anemia [D62] 10/29/2022 Yes    Atrial fibrillation, chronic [I48.20] 10/29/2022 Yes    Primary hypertension [I10] 10/29/2022 Yes    Coronary artery disease involving native coronary artery without angina pectoris [I25.10] 05/25/2016 Yes      Problems Resolved During this Admission:       Discharged Condition: good    Disposition: Home or Self Care    Follow Up:   Follow-up Information     Fredo Lemus MD. Schedule an appointment as soon as possible for a visit in 1 week(s).    Specialty: General Practice  Why: Message left to schedule follow up  Contact information:  [address]  [place] [address]  [phone]             [facility] GASTROENTEROLOGY Follow up in 2 week(s).    Specialty: Gastroenterology  Why: Message sent for clinic to contact patient to schedule follow up  Contact  information:  [address] [place] [address]  [phone]                     Patient Instructions:      Diet Cardiac     Notify your health care provider if you experience any of the following:  temperature >100.4     Notify your health care provider if you experience any of the following:  persistent nausea and vomiting or diarrhea     Notify your health care provider if you experience any of the following:  severe uncontrolled pain     Notify your health care provider if you experience any of the following:  redness, tenderness, or signs of infection (pain, swelling, redness, odor or green/yellow discharge around incision site)     Notify your health care provider if you experience any of the following:  difficulty breathing or increased cough     Notify your health care provider if you experience any of the following:  severe persistent headache     Notify your health care provider if you experience any of the following:  worsening rash     Notify your health care provider if you experience any of the following:  persistent dizziness, light-headedness, or visual disturbances     Notify your health care provider if you experience any of the following:  increased confusion or weakness     Activity as tolerated       Significant Diagnostic Studies: Labs: All labs within the past 24 hours have been reviewed    Pending Diagnostic Studies:     None         Medications:  Reconciled Home Medications:      Medication List      START taking these medications    pantoprazole 40 MG tablet  Commonly known as: PROTONIX  Take 1 tablet (40 mg total) by mouth 2 (two) times daily.        CONTINUE taking these medications    amiodarone 200 MG Tab  Commonly known as: PACERONE  Take 200 mg by mouth once daily.     apixaban 5 mg Tab  Commonly known as: ELIQUIS  Take 5 mg by mouth 2 (two) times daily.     ascorbic acid (vitamin C) 1000 MG tablet  Commonly known as: VITAMIN C  Take 1,000 mg by mouth once daily.      isosorbide mononitrate 20 MG Tab  Commonly known as: ISMO,MONOKET  Take 90 mg by mouth once daily.     magnesium oxide 400 mg (241.3 mg magnesium) tablet  Commonly known as: MAG-OX  Take 400 mg by mouth once daily.     metoprolol succinate 50 MG 24 hr tablet  Commonly known as: TOPROL-XL  Take 50 mg by mouth once daily.     multivitamin per tablet  Commonly known as: THERAGRAN  Take 1 tablet by mouth once daily.     nitroGLYCERIN 0.4 MG SL tablet  Commonly known as: NITROSTAT  Place 0.4 mg under the tongue every 5 (five) minutes as needed for Chest pain.     ranolazine 500 MG Tb12  Commonly known as: RANEXA  Take 1,000 mg by mouth 2 (two) times daily.     rosuvastatin 20 MG tablet  Commonly known as: CRESTOR  Take 20 mg by mouth once daily.        STOP taking these medications    aspirin 81 MG EC tablet  Commonly known as: ECOTRIN            Indwelling Lines/Drains at time of discharge:   Lines/Drains/Airways     None                 Time spent on the discharge of patient: 35 minutes         Jazlyn Morrow MD  Department of Hospital Medicine  [facility] Surg

## 2022-11-01 NOTE — NURSING
Pt resting in bed asleep. No complaints. Scheduled medication given. Daughter remains at bedside. IV saline lock. Remained free from fall/injury. Safety measures maintained. Will cont to monitor

## 2022-11-01 NOTE — PLAN OF CARE
[facility] - Med Surg  Discharge Final Note    Patient clear to discharge from case management stand point. Reviewed follow up for cardiology, GI and pcp with patient and daughter at bedside, verbalized understanding. Pt's daughter to be his help home.     Primary Care Provider: Fredo Lemus MD    Expected Discharge Date: 11/1/2022    Final Discharge Note (most recent)       Final Note - 11/01/22 1036          Final Note    Assessment Type Final Discharge Note     Anticipated Discharge Disposition Home or Self Care     What phone number can be called within the next 1-3 days to see how you are doing after discharge? [phone]     Hospital Resources/Appts/Education Provided Provided patient/caregiver with written discharge plan information;Appointments scheduled and added to AVS        Post-Acute Status    Coverage Medicare     Discharge Delays None known at this time                     Important Message from Medicare  Important Message from Medicare regarding Discharge Appeal Rights: Given to patient/caregiver, Explained to patient/caregiver, Signed/date by patient/caregiver     Date IMM was signed: 10/31/22  Time IMM was signed: 1202    Contact Info       Fredo Lemus MD   Specialty: General Practice   Relationship: PCP - [facility]  [address] [address]   Phone: [phone]       Next Steps: Schedule an appointment as soon as possible for a visit in 1 week(s)    Instructions: Message left to schedule follow up    Cardiology        Next Steps: Schedule an appointment as soon as possible for a visit in 2 week(s)    Gastroenterology        Next Steps: Schedule an appointment as soon as possible for a visit    Instructions: GI follow up for repeat EGD in 2 months

## 2022-11-01 NOTE — TELEPHONE ENCOUNTER
MA returned call and spoke with patient's daughter, Marielena.     An appointment is not needed at this time. She was informed that her father will follow up with his local PCP to manage his blood counts.

## 2022-11-01 NOTE — TELEPHONE ENCOUNTER
----- Message from Josie Duron sent at 11/1/2022  9:29 AM CDT -----  Name of Who is Calling: KELTON CASTLE [[id number]]              What is the request in detail: Patient requesting a call back to schedule follow up appointment              Can the clinic reply by [facility]: No               [facility] [phone]

## 2022-11-01 NOTE — NURSING
11/1/22 1040 Patient IV's removed at this time. Tips intact, dry dressing applied. Telemetry also removed at this time.     11/1/22 1056 Patient provided discharge packet at this time. Packet includes medication list, follow up appointments and educational materials. Nurse reviewed this information with patient and daughter. Both of which verbalize understanding.

## 2022-11-01 NOTE — NURSING
Report received and care assumed. Awake alert and oriented x 4. Daughter at bedside. Denies pain or discomfort

## 2022-11-02 ENCOUNTER — PATIENT OUTREACH (OUTPATIENT)
Dept: ADMINISTRATIVE | Facility: CLINIC | Age: 87
End: 2022-11-02
Payer: MEDICARE

## 2022-11-02 NOTE — PROGRESS NOTES
C3 nurse spoke with Paul Adame  for a TCC post hospital discharge follow up call. The patient has a scheduled HOSFU appointment with Fredo Lemus MD  on 11/9/22 @ 5579.

## 2022-11-02 NOTE — PHYSICIAN QUERY
PT Name: Paul Adame  MR #: [id number]     DOCUMENTATION CLARIFICATION      CDS: Kathi CAMARGO,RN        Contact information:[email or web address]  This form is a permanent document in the medical record.    Query Date: November 1, 2022    By submitting this query, we are merely seeking further clarification of documentation to reflect the severity of illness of your patient. Please utilize your independent clinical judgment when addressing the question(s) below.     The Medical Record contains the following:   Indicators   Supporting Clinical Findings Location in Medical Record   x Gastrointestinal Ulcer Documented Gastrointestinal hemorrhage associated with gastric ulcer  Admitted with upper GI bleeding. EGD at OSH with poor visualization due to blood  EGD 10/31 with non bleeding gastric ulcer    10/31   PN/[facility]   x EGD/Colonscopy Findings Findings:   The esophagus was normal.   One non-bleeding linear gastric ulcer with no stigmata of bleeding was found in the cardia. The lesion was 10 mm in largest dimension. shallow ulcer, with some dark pigmentation, but no visible vessel. there are no features that suggest this is malignancy. The examined duodenum was normal.       10/31 Upper GI Endoscopy    Pathology Findings      Radiology Findings     x Treatment/Medication Pantoprazole EC tablet 40 mg Oral 2 times daily  Pantoprazole injection  Intravenous 2 times daily  10/31---->11/1 MAR   10/29---->10/31 MAR   x Other Hgb: 9.2---->9.2--->8.8----->8.5---->9.0  Hct: 27.6--->29.0-->27.4-->27.5-->27.8  10/29---->11/1 Labs      Please further specify the acuity of the  gastric ulcer:    [  x ] Acute   [   ] Chronic   [   ] Other (please specify): ___________   [   ] Clinically Undetermined       Please document in your progress notes daily for the duration of treatment until resolved, and include in your discharge summary.  Form No. [id number]

## 2022-11-07 NOTE — ANESTHESIA POSTPROCEDURE EVALUATION
Anesthesia Post Evaluation    Patient: Paul Adame    Procedure(s) Performed: Procedure(s) (LRB):  EGD (ESOPHAGOGASTRODUODENOSCOPY) (N/A)    Final Anesthesia Type: general      Patient location during evaluation: GI PACU  Patient participation: Yes- Able to Participate  Level of consciousness: awake and alert  Post-procedure vital signs: reviewed and stable  Pain management: adequate  Airway patency: patent    PONV status at discharge: No PONV  Anesthetic complications: no      Cardiovascular status: blood pressure returned to baseline and hemodynamically stable  Respiratory status: unassisted and spontaneous ventilation  Hydration status: euvolemic  Follow-up not needed.          Vitals Value Taken Time   /56 11/01/22 0810   Temp 35.8 °C (96.4 °F) 11/01/22 0733   Pulse 82 11/01/22 0810   Resp 20 11/01/22 0733   SpO2 97 % 11/01/22 0733         Event Time   Out of Recovery 10/31/2022 09:50:00         Pain/[place] Score: No data recorded

## 2023-01-30 PROBLEM — K25.4 GASTROINTESTINAL HEMORRHAGE ASSOCIATED WITH GASTRIC ULCER: Status: RESOLVED | Noted: 2022-10-29 | Resolved: 2023-01-30
